# Patient Record
Sex: FEMALE | Race: WHITE
[De-identification: names, ages, dates, MRNs, and addresses within clinical notes are randomized per-mention and may not be internally consistent; named-entity substitution may affect disease eponyms.]

---

## 2018-04-21 ENCOUNTER — HOSPITAL ENCOUNTER (INPATIENT)
Dept: HOSPITAL 17 - NEPC | Age: 83
LOS: 4 days | Discharge: HOME HEALTH | DRG: 190 | End: 2018-04-25
Attending: HOSPITALIST | Admitting: HOSPITALIST
Payer: COMMERCIAL

## 2018-04-21 VITALS
DIASTOLIC BLOOD PRESSURE: 66 MMHG | OXYGEN SATURATION: 95 % | TEMPERATURE: 98.3 F | RESPIRATION RATE: 25 BRPM | HEART RATE: 106 BPM | SYSTOLIC BLOOD PRESSURE: 159 MMHG

## 2018-04-21 VITALS
DIASTOLIC BLOOD PRESSURE: 63 MMHG | HEART RATE: 101 BPM | OXYGEN SATURATION: 95 % | RESPIRATION RATE: 24 BRPM | TEMPERATURE: 98 F | SYSTOLIC BLOOD PRESSURE: 151 MMHG

## 2018-04-21 VITALS
DIASTOLIC BLOOD PRESSURE: 67 MMHG | TEMPERATURE: 97.8 F | SYSTOLIC BLOOD PRESSURE: 150 MMHG | HEART RATE: 101 BPM | OXYGEN SATURATION: 97 % | RESPIRATION RATE: 30 BRPM

## 2018-04-21 VITALS — OXYGEN SATURATION: 97 %

## 2018-04-21 VITALS — WEIGHT: 187.83 LBS | HEIGHT: 61 IN | BODY MASS INDEX: 35.46 KG/M2

## 2018-04-21 VITALS
HEART RATE: 102 BPM | TEMPERATURE: 98 F | SYSTOLIC BLOOD PRESSURE: 149 MMHG | DIASTOLIC BLOOD PRESSURE: 67 MMHG | RESPIRATION RATE: 20 BRPM | OXYGEN SATURATION: 95 %

## 2018-04-21 VITALS
RESPIRATION RATE: 15 BRPM | OXYGEN SATURATION: 99 % | SYSTOLIC BLOOD PRESSURE: 176 MMHG | DIASTOLIC BLOOD PRESSURE: 64 MMHG | HEART RATE: 85 BPM | TEMPERATURE: 98.3 F

## 2018-04-21 VITALS — OXYGEN SATURATION: 99 %

## 2018-04-21 VITALS — TEMPERATURE: 98 F | DIASTOLIC BLOOD PRESSURE: 81 MMHG | SYSTOLIC BLOOD PRESSURE: 133 MMHG

## 2018-04-21 VITALS
OXYGEN SATURATION: 95 % | TEMPERATURE: 98.1 F | HEART RATE: 99 BPM | RESPIRATION RATE: 19 BRPM | DIASTOLIC BLOOD PRESSURE: 76 MMHG | SYSTOLIC BLOOD PRESSURE: 154 MMHG

## 2018-04-21 VITALS
DIASTOLIC BLOOD PRESSURE: 91 MMHG | RESPIRATION RATE: 38 BRPM | OXYGEN SATURATION: 78 % | HEART RATE: 108 BPM | TEMPERATURE: 98 F | SYSTOLIC BLOOD PRESSURE: 170 MMHG

## 2018-04-21 VITALS
DIASTOLIC BLOOD PRESSURE: 67 MMHG | HEART RATE: 96 BPM | SYSTOLIC BLOOD PRESSURE: 155 MMHG | RESPIRATION RATE: 30 BRPM | OXYGEN SATURATION: 97 %

## 2018-04-21 VITALS — OXYGEN SATURATION: 96 %

## 2018-04-21 VITALS — HEART RATE: 96 BPM

## 2018-04-21 VITALS — HEART RATE: 89 BPM

## 2018-04-21 DIAGNOSIS — J44.1: Primary | ICD-10-CM

## 2018-04-21 DIAGNOSIS — E66.9: ICD-10-CM

## 2018-04-21 DIAGNOSIS — K21.9: ICD-10-CM

## 2018-04-21 DIAGNOSIS — Z85.3: ICD-10-CM

## 2018-04-21 DIAGNOSIS — Z87.11: ICD-10-CM

## 2018-04-21 DIAGNOSIS — R78.81: ICD-10-CM

## 2018-04-21 DIAGNOSIS — F17.210: ICD-10-CM

## 2018-04-21 DIAGNOSIS — J96.01: ICD-10-CM

## 2018-04-21 DIAGNOSIS — R07.9: ICD-10-CM

## 2018-04-21 DIAGNOSIS — E11.9: ICD-10-CM

## 2018-04-21 DIAGNOSIS — Z79.4: ICD-10-CM

## 2018-04-21 DIAGNOSIS — I48.91: ICD-10-CM

## 2018-04-21 LAB
BASOPHILS # BLD AUTO: 0 TH/MM3 (ref 0–0.2)
BASOPHILS NFR BLD: 0.4 % (ref 0–2)
BUN SERPL-MCNC: 21 MG/DL (ref 7–18)
CALCIUM SERPL-MCNC: 8.9 MG/DL (ref 8.5–10.1)
CHLORIDE SERPL-SCNC: 104 MEQ/L (ref 98–107)
CREAT SERPL-MCNC: 0.83 MG/DL (ref 0.5–1)
EOSINOPHIL # BLD: 0 TH/MM3 (ref 0–0.4)
EOSINOPHIL NFR BLD: 0.4 % (ref 0–4)
ERYTHROCYTE [DISTWIDTH] IN BLOOD BY AUTOMATED COUNT: 15.5 % (ref 11.6–17.2)
GFR SERPLBLD BASED ON 1.73 SQ M-ARVRAT: 66 ML/MIN (ref 89–?)
GLUCOSE SERPL-MCNC: 191 MG/DL (ref 74–106)
HCO3 BLD-SCNC: 29.5 MEQ/L (ref 21–32)
HCT VFR BLD CALC: 42.2 % (ref 35–46)
HGB BLD-MCNC: 13.7 GM/DL (ref 11.6–15.3)
INR PPP: 1.2 RATIO
LYMPHOCYTES # BLD AUTO: 1 TH/MM3 (ref 1–4.8)
LYMPHOCYTES NFR BLD AUTO: 16.7 % (ref 9–44)
MAGNESIUM SERPL-MCNC: 1.7 MG/DL (ref 1.5–2.5)
MCH RBC QN AUTO: 28.5 PG (ref 27–34)
MCHC RBC AUTO-ENTMCNC: 32.6 % (ref 32–36)
MCV RBC AUTO: 87.4 FL (ref 80–100)
MONOCYTE #: 0.7 TH/MM3 (ref 0–0.9)
MONOCYTES NFR BLD: 11.6 % (ref 0–8)
NEUTROPHILS # BLD AUTO: 4.2 TH/MM3 (ref 1.8–7.7)
NEUTROPHILS NFR BLD AUTO: 70.9 % (ref 16–70)
PLATELET # BLD: 155 TH/MM3 (ref 150–450)
PMV BLD AUTO: 8.4 FL (ref 7–11)
PROTHROMBIN TIME: 11.7 SEC (ref 9.8–11.6)
RBC # BLD AUTO: 4.82 MIL/MM3 (ref 4–5.3)
SODIUM SERPL-SCNC: 140 MEQ/L (ref 136–145)
TROPONIN I SERPL-MCNC: (no result) NG/ML (ref 0.02–0.05)
WBC # BLD AUTO: 5.9 TH/MM3 (ref 4–11)

## 2018-04-21 PROCEDURE — 85379 FIBRIN DEGRADATION QUANT: CPT

## 2018-04-21 PROCEDURE — 93306 TTE W/DOPPLER COMPLETE: CPT

## 2018-04-21 PROCEDURE — 83605 ASSAY OF LACTIC ACID: CPT

## 2018-04-21 PROCEDURE — 82552 ASSAY OF CPK IN BLOOD: CPT

## 2018-04-21 PROCEDURE — 82550 ASSAY OF CK (CPK): CPT

## 2018-04-21 PROCEDURE — 71275 CT ANGIOGRAPHY CHEST: CPT

## 2018-04-21 PROCEDURE — 83880 ASSAY OF NATRIURETIC PEPTIDE: CPT

## 2018-04-21 PROCEDURE — 87205 SMEAR GRAM STAIN: CPT

## 2018-04-21 PROCEDURE — 80053 COMPREHEN METABOLIC PANEL: CPT

## 2018-04-21 PROCEDURE — 36600 WITHDRAWAL OF ARTERIAL BLOOD: CPT

## 2018-04-21 PROCEDURE — 94640 AIRWAY INHALATION TREATMENT: CPT

## 2018-04-21 PROCEDURE — 80048 BASIC METABOLIC PNL TOTAL CA: CPT

## 2018-04-21 PROCEDURE — 82948 REAGENT STRIP/BLOOD GLUCOSE: CPT

## 2018-04-21 PROCEDURE — 84443 ASSAY THYROID STIM HORMONE: CPT

## 2018-04-21 PROCEDURE — 85610 PROTHROMBIN TIME: CPT

## 2018-04-21 PROCEDURE — 94664 DEMO&/EVAL PT USE INHALER: CPT

## 2018-04-21 PROCEDURE — 76937 US GUIDE VASCULAR ACCESS: CPT

## 2018-04-21 PROCEDURE — 94618 PULMONARY STRESS TESTING: CPT

## 2018-04-21 PROCEDURE — 87186 SC STD MICRODIL/AGAR DIL: CPT

## 2018-04-21 PROCEDURE — 85730 THROMBOPLASTIN TIME PARTIAL: CPT

## 2018-04-21 PROCEDURE — 84484 ASSAY OF TROPONIN QUANT: CPT

## 2018-04-21 PROCEDURE — 83735 ASSAY OF MAGNESIUM: CPT

## 2018-04-21 PROCEDURE — 85025 COMPLETE CBC W/AUTO DIFF WBC: CPT

## 2018-04-21 PROCEDURE — 96374 THER/PROPH/DIAG INJ IV PUSH: CPT

## 2018-04-21 PROCEDURE — 71045 X-RAY EXAM CHEST 1 VIEW: CPT

## 2018-04-21 PROCEDURE — 82805 BLOOD GASES W/O2 SATURATION: CPT

## 2018-04-21 PROCEDURE — 87040 BLOOD CULTURE FOR BACTERIA: CPT

## 2018-04-21 PROCEDURE — 87077 CULTURE AEROBIC IDENTIFY: CPT

## 2018-04-21 PROCEDURE — 93005 ELECTROCARDIOGRAM TRACING: CPT

## 2018-04-21 PROCEDURE — 87804 INFLUENZA ASSAY W/OPTIC: CPT

## 2018-04-21 RX ADMIN — HUMAN INSULIN SCH: 100 INJECTION, SOLUTION SUBCUTANEOUS at 11:52

## 2018-04-21 RX ADMIN — IPRATROPIUM BROMIDE AND ALBUTEROL SULFATE SCH AMPULE: .5; 3 SOLUTION RESPIRATORY (INHALATION) at 09:15

## 2018-04-21 RX ADMIN — IPRATROPIUM BROMIDE AND ALBUTEROL SULFATE SCH AMPULE: .5; 3 SOLUTION RESPIRATORY (INHALATION) at 18:25

## 2018-04-21 RX ADMIN — ENOXAPARIN SODIUM SCH MG: 40 INJECTION SUBCUTANEOUS at 11:51

## 2018-04-21 RX ADMIN — IPRATROPIUM BROMIDE AND ALBUTEROL SULFATE SCH AMPULE: .5; 3 SOLUTION RESPIRATORY (INHALATION) at 09:17

## 2018-04-21 RX ADMIN — STANDARDIZED SENNA CONCENTRATE AND DOCUSATE SODIUM SCH TAB: 8.6; 5 TABLET, FILM COATED ORAL at 20:58

## 2018-04-21 RX ADMIN — HUMAN INSULIN SCH: 100 INJECTION, SOLUTION SUBCUTANEOUS at 21:02

## 2018-04-21 RX ADMIN — METHYLPREDNISOLONE SODIUM SUCCINATE SCH MG: 40 INJECTION, POWDER, FOR SOLUTION INTRAMUSCULAR; INTRAVENOUS at 20:58

## 2018-04-21 RX ADMIN — METHYLPREDNISOLONE SODIUM SUCCINATE SCH MG: 40 INJECTION, POWDER, FOR SOLUTION INTRAMUSCULAR; INTRAVENOUS at 15:32

## 2018-04-21 RX ADMIN — IPRATROPIUM BROMIDE AND ALBUTEROL SULFATE SCH AMPULE: .5; 3 SOLUTION RESPIRATORY (INHALATION) at 19:42

## 2018-04-21 RX ADMIN — HUMAN INSULIN SCH: 100 INJECTION, SOLUTION SUBCUTANEOUS at 18:13

## 2018-04-21 RX ADMIN — Medication SCH ML: at 20:57

## 2018-04-21 RX ADMIN — IPRATROPIUM BROMIDE AND ALBUTEROL SULFATE SCH AMPULE: .5; 3 SOLUTION RESPIRATORY (INHALATION) at 11:55

## 2018-04-21 RX ADMIN — ACYCLOVIR SCH UNITS: 800 TABLET ORAL at 20:59

## 2018-04-21 NOTE — RADRPT
EXAM DATE/TIME:  04/21/2018 17:37 

 

[facility] COMPARISON:     

No previous studies available for comparison.

 

 

INDICATIONS :     

Elevated d-dimer; rule out pulmonary embolus.

                      

 

IV CONTRAST:     

71 cc Omnipaque 350 (iohexol) IV 

 

 

RADIATION DOSE:     

21.40 CTDIvol (mGy) 

 

 

MEDICAL HISTORY :     

Carcinoma, breast.  

 

SURGICAL HISTORY :       

nodule removed

 

ENCOUNTER:      

Initial

 

ACUITY:      

1 day

 

PAIN SCALE:      

3/10

 

LOCATION:        

chest 

 

TECHNIQUE:     

Volumetric scanning of the chest was performed using a pulmonary embolism protocol MIP images were re
constructed.  Using automated exposure control and adjustment of the mA and/or kV according to patien
t size, radiation dose was kept as low as reasonably achievable to obtain optimal diagnostic quality 
images.   DICOM format image data is available electronically for review and comparison.  

 

Follow-up recommendations for detected pulmonary nodules are based at a minimum on nodule size and pa
tient risk factors according to Fleischner Society Guidelines.

 

FINDINGS:     

No filling defects identified to suggest pulmonary embolic disease. There is no pleural or pericardia
l effusion. Mild coronary calcification. Minimal scattered scarring in the lungs. No adenopathy.

 

No acute findings in the upper abdomen. Probable 3.4 cm cyst in the liver incompletely evaluated. Spl
enic granulomata present.

 

CONCLUSION:     

1. No filling defects to suggest pulmonary embolic disease. No effusions or adenopathy.

 

 

 

 

 Gold Wilde MD on April 21, 2018 at 17:56           

Board Certified Radiologist.

 This report was verified electronically.

## 2018-04-21 NOTE — RADRPT
EXAM DATE/TIME:  04/21/2018 09:34 

 

[facility] COMPARISON:     

No previous studies available for comparison.

 

                     

INDICATIONS :     

Shortness of breath.

                     

 

MEDICAL HISTORY :     

Emphysema.  Carcinoma, breast.     Diabetes.   

 

SURGICAL HISTORY :        

Right lumpectomy.

 

ENCOUNTER:     

Initial                                        

 

ACUITY:     

1 day      

 

PAIN SCORE:     

0/10

 

LOCATION:     

Bilateral chest 

 

FINDINGS:     

A single view of the chest demonstrates the lungs to be symmetrically aerated without evidence of mas
s, infiltrate or effusion.  The cardiomediastinal contours are unremarkable. A mild to moderate scoli
osis is noted with degenerative changes in the lower thoracic and upper lumbar spine. There are multi
ple overlying electrocardiogram leads. Oxygen tubing is present as well.

 

CONCLUSION:     No acute disease.  

 

 

 

 Zacarias Magaña MD on April 21, 2018 at 9:48           

Board Certified Radiologist.

 This report was verified electronically.

## 2018-04-21 NOTE — EKG
Date Performed: 04/21/2018       Time Performed: 09:00:27

 

PTAGE:      82 years

 

EKG:      SINUS TACHYCARDIA ABNORMAL RHYTHM ECG INTERPRETATION BASED ON A DEFAULT AGE OF 40 YEARS 

 

NO PREVIOUS TRACING            

 

DOCTOR:   Shyam Martinez  Interpretating Date/Time  04/21/2018 16:47:20

## 2018-04-21 NOTE — HHI.HP
__________________________________________________





HPI


Service


[facility]


Primary Care Physician


Non-Staff


Admission Diagnosis





Diagnoses:  


Chief Complaint:  


Shortness of breath, wheezing


Travel History


International Travel<30 Days:  No


Contact w/Intl Traveler <30 Da:  No


Traveled to Known Affected Are:  No


History of Present Illness


Patient is a very pleasant 83yo F with PMH of COPD not on home oxygen  presents 

to the ED with c/o sob and cough since yesterday.  Said she is a heavy cig 

smoker and smoke a pack a day.  Denies any fever, chest pain, sob, n/v, 

abdominal pain, focal weakness or numbness.  Patient  is from [place] and 

visiting her friend.  Says she never been hospitalized for COPD in the past.  

She is following with her doctors and is taking her medications.  Does not have 

a pulmonology doctor.


Denies chest pain  or palpitations. No n/v/d/c. No urinary complaints.





Review of Systems


Except as stated in HPI:  all other systems reviewed are Neg





Past Family Social History


Past Medical History


COPD, diabetes mellitus


Past Surgical History


Right breast lumpectomy, hernia repair, right shoulder rotator cuff surgery


Reported Medications





Reported Meds & Active Scripts


Active


Reported


Novolog Inj (Insulin Aspart) 1,000 Unit/10 Ml Vial 3 Units SQ BID


Levemir Inj (Insulin Detemir) 1,000 unit/ 10 ML Vial 30 Units SQ BID


     Do not mix with any other Insulin.


Metformin (Metformin HCl) 1,000 Mg Tab 1,000 Mg PO BIDPC


Allergies:  


Coded Allergies:  


     Penicillins (Verified  Allergy, Unknown, RASH, 18)


     Sulfa (Sulfonamide Antibiotics) (Verified  Allergy, Unknown, HIVES, 18

)


Family History


Father with the diabetes and reactive cancer he passed away at the age of 79


Mother stroke at the age of 74


Social History


Tobacco use half a pack 1 pack per day


Denies EtOH or illicit drug use





Physical Exam


Vital Signs





Vital Signs








  Date Time  Temp Pulse Resp B/P (MAP) Pulse Ox O2 Delivery O2 Flow Rate FiO2


 


18 10:06 97.8 101 30 150/67 (94) 97 Nasal Cannula 4.00 


 


18 09:31  96 30 155/67 (96) 97 Nasal Cannula 4.00 


 


18 09:18     97 Nasal Cannula 4.00 


 


18 09:02 98.0 108 38 170/91 (117) 78   


 


18 09:02  103 36  95 Nasal Cannula 4.00 








Physical Exam


GENERAL: This is a very pleasant 82-year-old female, well-nourished, well-

developed patient, in no apparent distress.


SKIN: No rashes, ecchymoses or lesions. Cool and dry.


HEAD: Atraumatic. Normocephalic. No temporal or scalp tenderness.


EYES: Pupils equal round and reactive. Extraocular motions intact. No scleral 

icterus. No injection or drainage. 


ENT: Nose without bleeding, purulent drainage or septal hematoma. Throat 

without erythema, tonsillar hypertrophy or exudate. Uvula midline. Airway 

patent.


NECK: Trachea midline. No JVD or lymphadenopathy. Supple, nontender, no 

meningeal signs.


CARDIOVASCULAR: Regular rate and rhythm without murmurs, gallops, or rubs. 


RESPIRATORY: Decreased breath sounds.  +Scattered wheezing. + Shortness of 

breath. 


GASTROINTESTINAL: Abdomen soft, non-tender, nondistended. No hepato-splenomegaly

, or palpable masses. No guarding.


MUSCULOSKELETAL: Extremities without clubbing, cyanosis, or edema. No joint 

tenderness, effusion, or edema noted. No calf tenderness. Negative Homans sign 

bilaterally.


NEUROLOGICAL: Awake and alert. Cranial nerves II through XII intact.  Motor and 

sensory grossly within normal limits. Five out of 5 muscle strength in all 

muscle groups.  Normal speech.


Laboratory





Laboratory Tests








Test


  18


09:02 18


10:27


 


White Blood Count 5.9  


 


Red Blood Count 4.82  


 


Hemoglobin 13.7  


 


Hematocrit 42.2  


 


Mean Corpuscular Volume 87.4  


 


Mean Corpuscular Hemoglobin 28.5  


 


Mean Corpuscular Hemoglobin


Concent 32.6 


  


 


 


Red Cell Distribution Width 15.5  


 


Platelet Count 155  


 


Mean Platelet Volume 8.4  


 


Neutrophils (%) (Auto) 70.9  


 


Lymphocytes (%) (Auto) 16.7  


 


Monocytes (%) (Auto) 11.6  


 


Eosinophils (%) (Auto) 0.4  


 


Basophils (%) (Auto) 0.4  


 


Neutrophils # (Auto) 4.2  


 


Lymphocytes # (Auto) 1.0  


 


Monocytes # (Auto) 0.7  


 


Eosinophils # (Auto) 0.0  


 


Basophils # (Auto) 0.0  


 


CBC Comment DIFF FINAL  


 


Differential Comment   


 


Prothrombin Time 11.7  


 


Prothromb Time International


Ratio 1.2 


  


 


 


Activated Partial


Thromboplast Time 23.8 


  


 


 


Blood Urea Nitrogen 21  


 


Creatinine 0.83  


 


Random Glucose 191  


 


Calcium Level 8.9  


 


Magnesium Level 1.7  


 


Sodium Level 140  


 


Potassium Level 4.6  


 


Chloride Level 104  


 


Carbon Dioxide Level 29.5  


 


Anion Gap 7  


 


Estimat Glomerular Filtration


Rate 66 


  


 


 


Lactic Acid Level 1.8  


 


Troponin I LESS THAN 0.02  


 


Blood Gas Puncture Site  LT RADIAL 


 


Blood Gas Patient Temperature  98.6 


 


Blood Gas HCO3  28 


 


Blood Gas Base Excess  2.5 


 


Blood Gas Oxygen Saturation  90 


 


Arterial Blood pH  7.33 


 


Arterial Blood Partial


Pressure CO2 


  54 


 


 


Arterial Blood Partial


Pressure O2 


  70 


 


 


Arterial Blood Oxygen Content  16.2 


 


Arterial Blood


Carboxyhemoglobin 


  1.5 


 


 


Arterial Blood Methemoglobin  0.9 


 


Blood Gas Hemoglobin  12.8 


 


Oxygen Delivery Device  NASAL CANNULA 


 


Blood Gas Liter Flow  2 














 Date/Time


Source Procedure


Growth Status


 


 


 18 09:18


Blood Peripheral Aerobic Blood Culture


Pending Received


 


 18 09:18


Blood Peripheral Anaerobic Blood Culture


Pending Received


 


 18 09:55


Nasal Aspirate Influenza Types A,B Antigen (ISAIAH) - Final


NEGATIVE FOR FLU A AND B ANTIGEN.... Complete








Result Diagram:  


18





Imaging





Last Impressions








Chest X-Ray 18 Signed





Impressions: 





 Service Date/Time:  2018 09:34 - CONCLUSION: No acute 





 disease.       Michael R. Schiering, MD Caprini VTE Risk Assessment


Caprini VTE Risk Assessment:  Mod/High Risk (score >= 2)


Caprini Risk Assessment Model











 Point Value = 1          Point Value = 2  Point Value = 3  Point Value = 5


 


Age 41-60


Minor surgery


BMI > 25 kg/m2


Swollen legs


Varicose veins


Pregnancy or postpartum


History of unexplained or recurrent


   spontaneous 


Oral contraceptives or hormone


   replacement


Sepsis (< 1 month)


Serious lung disease, including


   pneumonia (< 1 month)


Abnormal pulmonary function


Acute myocardial infarction


Congestive heart failure (< 1 month)


History of inflammatory bowel disease


Medical patient at bed rest Age 61-74


Arthroscopic surgery


Major open surgery (> 45 min)


Laparoscopic surgery (> 45 min)


Malignancy


Confined to bed (> 72 hours)


Immobilizing plaster cast


Central venous access Age >= 75


History of VTE


Family history of VTE


Factor V Leiden


Prothrombin 26823T


Lupus anticoagulant


Anticardiolipin antibodies


Elevated serum homocysteine


Heparin-induced thrombocytopenia


Other congenital or acquired


   thrombophilia Stroke (< 1 month)


Elective arthroplasty


Hip, pelvis, or leg fracture


Acute spinal cord injury (< 1 month)








Prophylaxis Regimen











   Total Risk


Factor Score Risk Level Prophylaxis Regimen


 


0-1      Low Early ambulation


 


2 Moderate Order ONE of the following:


*Sequential Compression Device (SCD)


*Heparin 5000 units SQ BID


 


3-4 Higher Order ONE of the following medications:


*Heparin 5000 units SQ TID


*Enoxaparin/Lovenox 40 mg SQ daily (WT < 150 kg, CrCl > 30 mL/min)


*Enoxaparin/Lovenox 30 mg SQ daily (WT < 150 kg, CrCl > 10-29 mL/min)


*Enoxaparin/Lovenox 30 mg SQ BID (WT < 150 kg, CrCl > 30 mL/min)


AND/OR


*Sequential Compression Device (SCD)


 


5 or more Highest Order ONE of the following medications:


*Heparin 5000 units SQ TID (Preferred with Epidurals)


*Enoxaparin/Lovenox 40 mg SQ daily (WT < 150 kg, CrCl > 30 mL/min)


*Enoxaparin/Lovenox 30 mg SQ daily (WT < 150 kg, CrCl > 10-29 mL/min)


*Enoxaparin/Lovenox 30 mg SQ BID (WT < 150 kg, CrCl > 30 mL/min)


AND


*Sequential Compression Device (SCD)











Assessment and Plan


Problem List:  


(1) Insulin dependent diabetes mellitus


ICD Code:  E11.9 - Type 2 diabetes mellitus without complications; Z79.4 - Long 

term (current) use of insulin


(2) GERD (gastroesophageal reflux disease)


ICD Code:  K21.9 - Gastro-esophageal reflux disease without esophagitis


(3) COPD exacerbation


ICD Code:  J44.1 - Chronic obstructive pulmonary disease with (acute) 

exacerbation


Status:  Acute


Assessment and Plan


Very pleasant 52-year-old female with





COPD with exacerbation


Hypoxia. Acute respiratory failure patient was dessatting at 78  % while on 2L


ABG reviewed


Duo nebs every 4 hours scheduled and every 2 hours as needed taper as tolerated.


Receive Zometa on 125 mg IV 1 time.  Continue Solu-Medrol 60 mg every 6 hours 

taper steroids as tolerated.


Monitor oxygen saturation and titrate oxygen as needed.  Oxygen supplement keep 

oxygen saturation more than 92%


Will need PFT as outpatient


Patient is also a history of breast cancer with lumpectomy.  Ordered d-dimer to 

rule out DVT /PE.  Positive d-dimer ordered  CTA pulmonary to rule out PE. CTA 

reviewed and no PE. 


Ativan prn for withdrawal symptoms, patient is a smoker 1 PPD refusing nicotine 

patch at this time. Counselled extensively regarding tobacco use. 





Insulin-dependent diabetes mellitus.  Restart home insulin Levemir 30 units 

twice daily, insulin sliding scale.  Accu-Cheks.  Monitor blood sugar and 

adjust insulin as needed








DVT prophylaxis SCDs/teds/Lovenox SQ


Discussed Condition With


Patient, family at bedside, nurse, ED physician Dr. Nava





Physician Certification


2 Midnight Certification Type:  Admission for Inpatient Services


Order for Inpatient Services


The services are ordered in accordance with Medicare regulations or non-

Medicare payer requirements, as applicable.  In the case of services not 

specified as inpatient-only, they are appropriately provided as inpatient 

services in accordance with the 2-midnight benchmark.


Estimated LOS (days):  3


 days is the estimated time the patient will need to remain in the hospital, 

assuming treatment plan goals are met and no additional complications.


Post-Hospital Plan:  Home











Michelle Owusu MD 2018 11:14

## 2018-04-21 NOTE — PD
HPI


Chief Complaint:  Respiratory Distress


Time Seen by Provider:  09:06


Travel History


International Travel<30 days:  No


Contact w/Intl Traveler<30days:  No


Traveled to known affect area:  No





History of Present Illness


HPI


81yo F with PMH of COPD not on home O2 presents to the ED with c/o sob and 

cough since yesterday.  Said she is a heavy cig smoker and smoke a pack a day.  

Denies any fever, chest pain, sob, n/v, abdominal pain, focal weakness or 

numbness.  Pt is from [place] and visiting her friend.





PFSH


Past Medical History


Diabetes:  Yes


Respiratory:  Yes





Social History


Tobacco Use:  Yes





Allergies-Medications


(Allergen,Severity, Reaction):  


Coded Allergies:  


     Penicillins (Verified  Allergy, Unknown, RASH, 4/21/18)


     Sulfa (Sulfonamide Antibiotics) (Verified  Allergy, Unknown, HIVES, 4/21/18

)


Reported Meds & Prescriptions





Reported Meds & Active Scripts


Active


Reported


Novolog Inj (Insulin Aspart) 1,000 Unit/10 Ml Vial 3 Units SQ BID


Levemir Inj (Insulin Detemir) 1,000 unit/ 10 ML Vial 30 Units SQ BID


     Do not mix with any other Insulin.


Metformin (Metformin HCl) 1,000 Mg Tab 1,000 Mg PO BIDPC








Review of Systems


Except as stated in HPI:  all other systems reviewed are Neg





Physical Exam


Narrative


GENERAL: 81yo F in moderate distress.


SKIN: Focused skin assessment warm/dry.


HEAD: Atraumatic. Normocephalic. 


EYES: Pupils equal and round. No scleral icterus. No injection or drainage. 


ENT: No nasal bleeding or discharge.  Mucous membranes pink and moist.


NECK: Trachea midline. No JVD. 


CARDIOVASCULAR: Regular rate and rhythm.  No murmur appreciated.


RESPIRATORY: + accessory muscle use. Expiratory wheezing diffusely.


GASTROINTESTINAL: Abdomen soft, non-tender, nondistended. Hepatic and splenic 

margins not palpable. 


MUSCULOSKELETAL: No obvious deformities. No clubbing.  No cyanosis.  No edema. 


NEUROLOGICAL: Awake and alert. No obvious cranial nerve deficits.  Motor 

grossly within normal limits. Normal speech.


PSYCHIATRIC: Appropriate mood and affect; insight and judgment normal.





Data


Data


Last Documented VS





Vital Signs








  Date Time  Temp Pulse Resp B/P (MAP) Pulse Ox O2 Delivery O2 Flow Rate FiO2


 


4/21/18 10:06 97.8 101 30 150/67 (94) 97 Nasal Cannula 4.00 








Orders





 Orders


Complete Blood Count With Diff (4/21/18 09:06)


Basic Metabolic Panel (Bmp) (4/21/18 09:06)


B-Type Natriuretic Peptide (4/21/18 09:06)


Act Partial Throm Time (Ptt) (4/21/18 09:06)


Prothrombin Time / Inr (Pt) (4/21/18 09:06)


Magnesium (Mg) (4/21/18 09:06)


Troponin I (4/21/18 09:06)


Influenzae A/B Antigen (4/21/18 09:06)


Blood Culture (4/21/18 09:06)


Electrocardiogram (4/21/18 09:06)


Chest, Single Ap (4/21/18 09:06)


Methylprednisolone So Succ Inj (Solumedr (4/21/18 09:15)


Albuterol-Ipratropium Neb (Duoneb Neb) (4/21/18 09:15)


Lactic Acid Sepsis Protocol (4/21/18 09:08)


Arterial Blood Gas (Abg) (4/21/18 )


Admit To Inpatient (4/21/18 )


Vital Signs (Adult) Q4H (4/21/18 11:12)


Activity Oob With Assistance (4/21/18 11:12)


Diet Heart Healthy (4/21/18 Lunch)


Sodium Chloride 0.9% Flush (Ns Flush) (4/21/18 11:15)


Sodium Chloride 0.9% Flush (Ns Flush) (4/21/18 21:00)


Acetaminophen (Tylenol) (4/21/18 11:15)


Ondansetron Inj (Zofran Inj) (4/21/18 11:15)


Comprehensive Metabolic Panel (4/22/18 06:00)


Complete Blood Count With Diff (4/22/18 06:00)


Resp Oxygen Denis C Titrat 1-4 L (4/21/18 )


Pt Request For Service (4/21/18 11:12)


Case Management Consult (4/21/18 11:12)


Enoxaparin Inj (Lovenox Inj) (4/21/18 12:00)


Scd Bilateral/Knee High BETY.BID (4/21/18 11:12)


Raf Bilateral/Knee High BETY.QSHIFT (4/21/18 11:12)


Naloxone Inj (Narcan Inj) (4/21/18 11:15)


Docusate Sodium-Senna (Sarah-Colace) (4/21/18 21:00)


Magnesium Hydroxide Liq (Milk Of Magnesi (4/21/18 11:15)


Sennosides (Senokot) (4/21/18 11:15)


Bisacodyl Supp (Dulcolax Supp) (4/21/18 11:15)


Lactulose Liq (Lactulose Liq) (4/21/18 11:15)


Inpatient Certification (4/21/18 )


Albuterol-Ipratropium Neb (Duoneb Neb) (4/21/18 12:00)


Albuterol-Ipratropium Neb (Duoneb Neb) (4/21/18 11:15)


Methylprednisolone So Succ Inj (Solumedr (4/21/18 15:00)


Bedside Glucose BETY.CSUGAR (4/21/18 11:17)


Blood Glucose Goal (Criteria) (4/21/18 11:17)


Hypoglycemia 70 Mg/Dl Or < (4/21/18 11:17)


Notify Dr: Other (4/21/18 11:17)


Dextrose 50% In Reginaldo (Vial) Inj (D50w (Vi (4/21/18 11:30)


Glucagon Inj (Glucagon Inj) (4/21/18 11:30)


Consult Diabetic Educator (4/21/18 )


Insulin Human Reg Supp Scale (Novolin R (4/21/18 12:00)


Insulin Detemir Inj (Levemir Inj) (4/21/18 21:00)


Admit Order (Ed Use Only) (4/21/18 11:19)





Labs





Laboratory Tests








Test


  4/21/18


09:02 4/21/18


10:27


 


White Blood Count 5.9 TH/MM3  


 


Red Blood Count 4.82 MIL/MM3  


 


Hemoglobin 13.7 GM/DL  


 


Hematocrit 42.2 %  


 


Mean Corpuscular Volume 87.4 FL  


 


Mean Corpuscular Hemoglobin 28.5 PG  


 


Mean Corpuscular Hemoglobin


Concent 32.6 % 


  


 


 


Red Cell Distribution Width 15.5 %  


 


Platelet Count 155 TH/MM3  


 


Mean Platelet Volume 8.4 FL  


 


Neutrophils (%) (Auto) 70.9 %  


 


Lymphocytes (%) (Auto) 16.7 %  


 


Monocytes (%) (Auto) 11.6 %  


 


Eosinophils (%) (Auto) 0.4 %  


 


Basophils (%) (Auto) 0.4 %  


 


Neutrophils # (Auto) 4.2 TH/MM3  


 


Lymphocytes # (Auto) 1.0 TH/MM3  


 


Monocytes # (Auto) 0.7 TH/MM3  


 


Eosinophils # (Auto) 0.0 TH/MM3  


 


Basophils # (Auto) 0.0 TH/MM3  


 


CBC Comment DIFF FINAL  


 


Differential Comment   


 


Prothrombin Time 11.7 SEC  


 


Prothromb Time International


Ratio 1.2 RATIO 


  


 


 


Activated Partial


Thromboplast Time 23.8 SEC 


  


 


 


Blood Urea Nitrogen 21 MG/DL  


 


Creatinine 0.83 MG/DL  


 


Random Glucose 191 MG/DL  


 


Calcium Level 8.9 MG/DL  


 


Magnesium Level 1.7 MG/DL  


 


Sodium Level 140 MEQ/L  


 


Potassium Level 4.6 MEQ/L  


 


Chloride Level 104 MEQ/L  


 


Carbon Dioxide Level 29.5 MEQ/L  


 


Anion Gap 7 MEQ/L  


 


Estimat Glomerular Filtration


Rate 66 ML/MIN 


  


 


 


Lactic Acid Level 1.8 mmol/L  


 


Troponin I


  LESS THAN 0.02


NG/ML 


 


 


Blood Gas Puncture Site  LT RADIAL 


 


Blood Gas Patient Temperature  98.6 


 


Blood Gas HCO3  28 mmol/L 


 


Blood Gas Base Excess  2.5 mmol/L 


 


Blood Gas Oxygen Saturation  90 % 


 


Arterial Blood pH  7.33 


 


Arterial Blood Partial


Pressure CO2 


  54 mmHg 


 


 


Arterial Blood Partial


Pressure O2 


  70 mmHG 


 


 


Arterial Blood Oxygen Content  16.2 Vol % 


 


Arterial Blood


Carboxyhemoglobin 


  1.5 % 


 


 


Arterial Blood Methemoglobin  0.9 % 


 


Blood Gas Hemoglobin  12.8 G/DL 


 


Oxygen Delivery Device  NASAL CANNULA 


 


Blood Gas Liter Flow  2 L/M 











MDM


Medical Decision Making


Medical Screen Exam Complete:  Yes


Emergency Medical Condition:  Yes


Interpretation(s)


EKG: Sinus tachycardia at 105bpm.  Normal axis.  No ST segment elevation or 

depression.


Differential Diagnosis


COPD exacerbation vs. pneumonia vs. ACS


Narrative Course


81yo F with COPD here with sob since yesterday. Pt is tachypneic and hypoxic on 

arrival.  O2 sat is 79% on RA and place on oxygen immediately.  Oxygen improved 

to mid 80s on 2L NC and mid 90s on 4L NC.  Labs reviewed, no leukocytosis.  

Lactic acid normal at 1.8.  Troponin negative.  ABG on 2L showed pO2 70 with O2 

sat 90%.  pCO2 is 54, likely chronic.  pH of 7.33.  Pt reevaluated after 

duonebs x3 and methylprednisolone and is feeling much better.  Influenza 

negative.  CXR negative.  Pt's oxygen is at 90% on 2L so respiratory therapy 

place it back on 4L.  Pt said she feels much better but is still wheezing and 

mildly tachypneic.  At this time, do not think she needs BIPAP but she may if 

she remains tachypneic.  Discussed with Dr. Owusu and accepted to her service.


Critical Care Narrative


Aggregate critical care time was 40 minutes. Time to perform other separately 

billable procedures was not  


included in the critical care time. My time did not include minutes spent 

treating any other patients simultaneously or on  


activities that did not directly contribute to the patient's treatment.  





The services I provided to this patient were to treat and/or prevent clinically 

significant deterioration that could result  


in:  respiratory distress or death.





I provided critical care services requiring my management, as noted below:


Chart data review, documentation time, medication orders and management, vital 

sign assessments/reviewing monitor data,  


ordering and reviewing lab tests, ordering and interpreting/reviewing x-rays 

and diagnostic studies, care of the patient  


and discussion of the patient with the admitting physicians.





Diagnosis





 Primary Impression:  


 COPD exacerbation





Admitting Information


Admitting Physician Requests:  Luzmaria Mcgrath DO Apr 21, 2018 09:20
98.6

## 2018-04-22 VITALS
DIASTOLIC BLOOD PRESSURE: 58 MMHG | HEART RATE: 105 BPM | OXYGEN SATURATION: 96 % | RESPIRATION RATE: 19 BRPM | TEMPERATURE: 98.1 F | SYSTOLIC BLOOD PRESSURE: 113 MMHG

## 2018-04-22 VITALS — HEART RATE: 84 BPM

## 2018-04-22 VITALS
SYSTOLIC BLOOD PRESSURE: 160 MMHG | DIASTOLIC BLOOD PRESSURE: 70 MMHG | HEART RATE: 90 BPM | TEMPERATURE: 98 F | RESPIRATION RATE: 20 BRPM | OXYGEN SATURATION: 97 %

## 2018-04-22 VITALS
OXYGEN SATURATION: 93 % | HEART RATE: 95 BPM | TEMPERATURE: 98.7 F | SYSTOLIC BLOOD PRESSURE: 162 MMHG | DIASTOLIC BLOOD PRESSURE: 75 MMHG | RESPIRATION RATE: 19 BRPM

## 2018-04-22 VITALS
OXYGEN SATURATION: 94 % | HEART RATE: 108 BPM | DIASTOLIC BLOOD PRESSURE: 71 MMHG | TEMPERATURE: 97.8 F | SYSTOLIC BLOOD PRESSURE: 152 MMHG | RESPIRATION RATE: 20 BRPM

## 2018-04-22 VITALS
TEMPERATURE: 98.9 F | SYSTOLIC BLOOD PRESSURE: 123 MMHG | DIASTOLIC BLOOD PRESSURE: 57 MMHG | HEART RATE: 98 BPM | RESPIRATION RATE: 20 BRPM | OXYGEN SATURATION: 95 %

## 2018-04-22 VITALS — OXYGEN SATURATION: 97 %

## 2018-04-22 VITALS
TEMPERATURE: 98.6 F | HEART RATE: 104 BPM | SYSTOLIC BLOOD PRESSURE: 149 MMHG | RESPIRATION RATE: 20 BRPM | OXYGEN SATURATION: 94 % | DIASTOLIC BLOOD PRESSURE: 70 MMHG

## 2018-04-22 VITALS — HEART RATE: 110 BPM

## 2018-04-22 VITALS — OXYGEN SATURATION: 95 %

## 2018-04-22 VITALS — HEART RATE: 79 BPM

## 2018-04-22 LAB
ALBUMIN SERPL-MCNC: 3.2 GM/DL (ref 3.4–5)
ALP SERPL-CCNC: 79 U/L (ref 45–117)
ALT SERPL-CCNC: 21 U/L (ref 10–53)
AST SERPL-CCNC: 17 U/L (ref 15–37)
BASOPHILS # BLD AUTO: 0 TH/MM3 (ref 0–0.2)
BASOPHILS NFR BLD: 0.2 % (ref 0–2)
BILIRUB SERPL-MCNC: 0.3 MG/DL (ref 0.2–1)
BUN SERPL-MCNC: 26 MG/DL (ref 7–18)
CALCIUM SERPL-MCNC: 9 MG/DL (ref 8.5–10.1)
CHLORIDE SERPL-SCNC: 102 MEQ/L (ref 98–107)
CREAT SERPL-MCNC: 0.79 MG/DL (ref 0.5–1)
EOSINOPHIL # BLD: 0 TH/MM3 (ref 0–0.4)
EOSINOPHIL NFR BLD: 0 % (ref 0–4)
ERYTHROCYTE [DISTWIDTH] IN BLOOD BY AUTOMATED COUNT: 15.2 % (ref 11.6–17.2)
GFR SERPLBLD BASED ON 1.73 SQ M-ARVRAT: 70 ML/MIN (ref 89–?)
GLUCOSE SERPL-MCNC: 236 MG/DL (ref 74–106)
HCO3 BLD-SCNC: 27.5 MEQ/L (ref 21–32)
HCT VFR BLD CALC: 38.3 % (ref 35–46)
HGB BLD-MCNC: 12.9 GM/DL (ref 11.6–15.3)
LYMPHOCYTES # BLD AUTO: 0.7 TH/MM3 (ref 1–4.8)
LYMPHOCYTES NFR BLD AUTO: 6.2 % (ref 9–44)
MCH RBC QN AUTO: 29.2 PG (ref 27–34)
MCHC RBC AUTO-ENTMCNC: 33.6 % (ref 32–36)
MCV RBC AUTO: 86.9 FL (ref 80–100)
MONOCYTE #: 0.5 TH/MM3 (ref 0–0.9)
MONOCYTES NFR BLD: 4.5 % (ref 0–8)
NEUTROPHILS # BLD AUTO: 9.8 TH/MM3 (ref 1.8–7.7)
NEUTROPHILS NFR BLD AUTO: 89.1 % (ref 16–70)
PLATELET # BLD: 155 TH/MM3 (ref 150–450)
PMV BLD AUTO: 8.8 FL (ref 7–11)
PROT SERPL-MCNC: 8.5 GM/DL (ref 6.4–8.2)
RBC # BLD AUTO: 4.41 MIL/MM3 (ref 4–5.3)
SODIUM SERPL-SCNC: 137 MEQ/L (ref 136–145)
WBC # BLD AUTO: 11 TH/MM3 (ref 4–11)

## 2018-04-22 RX ADMIN — ENOXAPARIN SODIUM SCH MG: 40 INJECTION SUBCUTANEOUS at 11:39

## 2018-04-22 RX ADMIN — METHYLPREDNISOLONE SODIUM SUCCINATE SCH MG: 40 INJECTION, POWDER, FOR SOLUTION INTRAMUSCULAR; INTRAVENOUS at 15:07

## 2018-04-22 RX ADMIN — IPRATROPIUM BROMIDE AND ALBUTEROL SULFATE SCH AMPULE: .5; 3 SOLUTION RESPIRATORY (INHALATION) at 20:13

## 2018-04-22 RX ADMIN — METHYLPREDNISOLONE SODIUM SUCCINATE SCH MG: 40 INJECTION, POWDER, FOR SOLUTION INTRAMUSCULAR; INTRAVENOUS at 03:20

## 2018-04-22 RX ADMIN — HUMAN INSULIN SCH: 100 INJECTION, SOLUTION SUBCUTANEOUS at 21:41

## 2018-04-22 RX ADMIN — GUAIFENESIN SCH MG: 600 TABLET, EXTENDED RELEASE ORAL at 21:39

## 2018-04-22 RX ADMIN — IPRATROPIUM BROMIDE AND ALBUTEROL SULFATE SCH AMPULE: .5; 3 SOLUTION RESPIRATORY (INHALATION) at 09:00

## 2018-04-22 RX ADMIN — HUMAN INSULIN SCH: 100 INJECTION, SOLUTION SUBCUTANEOUS at 08:21

## 2018-04-22 RX ADMIN — HUMAN INSULIN SCH: 100 INJECTION, SOLUTION SUBCUTANEOUS at 17:37

## 2018-04-22 RX ADMIN — ACYCLOVIR SCH UNITS: 800 TABLET ORAL at 08:14

## 2018-04-22 RX ADMIN — STANDARDIZED SENNA CONCENTRATE AND DOCUSATE SODIUM SCH TAB: 8.6; 5 TABLET, FILM COATED ORAL at 08:13

## 2018-04-22 RX ADMIN — IPRATROPIUM BROMIDE AND ALBUTEROL SULFATE SCH AMPULE: .5; 3 SOLUTION RESPIRATORY (INHALATION) at 16:43

## 2018-04-22 RX ADMIN — Medication SCH ML: at 08:20

## 2018-04-22 RX ADMIN — ACYCLOVIR SCH UNITS: 800 TABLET ORAL at 21:40

## 2018-04-22 RX ADMIN — Medication SCH ML: at 21:42

## 2018-04-22 RX ADMIN — HUMAN INSULIN SCH: 100 INJECTION, SOLUTION SUBCUTANEOUS at 11:40

## 2018-04-22 RX ADMIN — LEVOFLOXACIN SCH MG: 500 TABLET, FILM COATED ORAL at 08:28

## 2018-04-22 RX ADMIN — METHYLPREDNISOLONE SODIUM SUCCINATE SCH MG: 40 INJECTION, POWDER, FOR SOLUTION INTRAMUSCULAR; INTRAVENOUS at 21:39

## 2018-04-22 RX ADMIN — STANDARDIZED SENNA CONCENTRATE AND DOCUSATE SODIUM SCH TAB: 8.6; 5 TABLET, FILM COATED ORAL at 21:00

## 2018-04-22 RX ADMIN — METHYLPREDNISOLONE SODIUM SUCCINATE SCH MG: 40 INJECTION, POWDER, FOR SOLUTION INTRAMUSCULAR; INTRAVENOUS at 08:14

## 2018-04-22 RX ADMIN — IPRATROPIUM BROMIDE AND ALBUTEROL SULFATE SCH AMPULE: .5; 3 SOLUTION RESPIRATORY (INHALATION) at 11:59

## 2018-04-22 NOTE — HHI.PR
Subjective


Remarks


Pt seen and examined. VS reviewed.  Requiring 3 L supplemental O2.  Patient 

reports her breathing is mildly better than yesterday but she states she has 

not really been ambulatory. She denies chest pain, abdominal pain, nausea, 

vomiting. Her appetite is good. She states she has a dry cough and feels like 

she needs to clear mucus from her chest.





I spoke with her daughter on the phone who states that the patient has been 

smoking significantly more in the past couple months.  She follows with a 

primary care doctor who has prescribed her inhalers for COPD but the patient 

refuses to take them.  She states she does not believe her mother acknowledges 

the severity of her lung disease.  The patient states she smokes a pack per day 

but wants to quit.  At this time she denies A nicotine patch.





Objective





Vital Signs








  Date Time  Temp Pulse Resp B/P (MAP) Pulse Ox O2 Delivery O2 Flow Rate FiO2


 


4/22/18 04:00 98.0 90 20 160/70 (100) 97   


 


4/22/18 03:42  79      


 


4/22/18 00:00 97.8 108 20 152/71 (98) 94   


 


4/21/18 23:40  89      


 


4/21/18 20:00 98.0 102 20 149/67 (94) 95   


 


4/21/18 19:47  96      


 


4/21/18 19:44     96 Nasal Cannula 3.00 


 


4/21/18 18:25     99 Nasal Cannula 4.00 


 


4/21/18 16:00 98.1 99 19 154/76 (102) 95   


 


4/21/18 15:55 98.0 98 22 133/81 (98) 97 Nasal Cannula 4.00 


 


4/21/18 15:18 98.3 85 15 176/64 (101) 99 Nasal Cannula 2.00 


 


4/21/18 13:48 98.3 106 25 159/66 (97) 95 Nasal Cannula  


 


4/21/18 11:46 98.0 101 24 151/63 (92) 95 Nasal Cannula 4.00 


 


4/21/18 10:06 97.8 101 30 150/67 (94) 97 Nasal Cannula 4.00 


 


4/21/18 09:31  96 30 155/67 (96) 97 Nasal Cannula 4.00 


 


4/21/18 09:18     97 Nasal Cannula 4.00 


 


4/21/18 09:02 98.0 108 38 170/91 (117) 78   


 


4/21/18 09:02  103 36  95 Nasal Cannula 4.00 














I/O      


 


 4/21/18 4/21/18 4/21/18 4/22/18 4/22/18 4/22/18





 07:00 15:00 23:00 07:00 15:00 23:00


 


Intake Total  300 ml  600 ml  


 


Balance  300 ml  600 ml  


 


      


 


Intake Oral  300 ml  600 ml  


 


# Voids    4  


 


# Bowel Movements    0  








Result Diagram:  


4/22/18 0620                                                                   

             4/22/18 0620





Imaging











Chest X-Ray 4/21/18 0906 Signed





Impressions: 





 Service Date/Time:  Saturday, April 21, 2018 09:34 - CONCLUSION: No acute 





 disease.       Zacarias Magaña MD 


 


CT Angiography 4/21/18 0000 Signed





Impressions: 





 Service Date/Time:  Saturday, April 21, 2018 17:37 - CONCLUSION:  1. No 

filling 





 defects to suggest pulmonary embolic disease. No effusions or adenopathy.      





 Gold Wilde MD 








Objective Remarks


GENERAL: Pleasant, obese [place]mani female resting in bed in NAD.


SKIN: Warm and dry.


HEENT: AT/NC. Pupils equal and round. MMM.


HEART: RRR no m/r/g.


LUNGS: Diminished breath sounds with expiratory wheezing.


ABDOMEN: +BS, soft, NT, ND.


EXTREMITIES: No LE edema. Diminished but palpable pedal pulses.


NEURO: Awake and alert. Nonfocal.


PSYCH: Appropriate mood and affect.





A/P


Assessment and Plan


82 year old [place] female with COPD, IDDM, tobacco abuse, and history of breast 

cancer admitted on 4/21 for acute COPD exacerbation.





1. COPD exacerbation


- Desatted to 78% on room air in the ED


- Maintaining adequate sats on 2-4 L NC


- DuoNeb Q4H scheduled 


- SoluMedrol 40 mg IV Q6H


- White cell count bumped from 5.9 to 11 likely secondary to steroid 

demargination but added Levaquin for coverage of exacerbation since severe 

enough to require hospitalization


- Counseled extensively on the need to be compliant with maintenance inhalers 

and the absolute need to stop smoking





2. Hypoxia


- ABG on admission: pH 7.33, PCO2 54, PO2 70, HCO3 28


- Supplemental O2 PRN


- D-dimer elevated given hypoxia and h/o breast cancer but CTA negative for 

embolism


- May need walk test prior to discharge if still requiring O2





3. Insulin dependent DM


- Levemir 30 units BID


- SSI per protocol





4. Tobacco abuse


- Refusing nicotine patch


- Ativan PRN withdraw symptoms


- Counseled extensively on cessation





DVT prophylaxis: SCDs/teds/Lovenox SQ


Discharge Planning


Anticipate D/C in a couple days if patient continues to improve











Sarah Sheridan MD Apr 22, 2018 08:20

## 2018-04-23 VITALS
RESPIRATION RATE: 17 BRPM | TEMPERATURE: 98.1 F | OXYGEN SATURATION: 94 % | SYSTOLIC BLOOD PRESSURE: 155 MMHG | HEART RATE: 91 BPM | DIASTOLIC BLOOD PRESSURE: 64 MMHG

## 2018-04-23 VITALS
DIASTOLIC BLOOD PRESSURE: 78 MMHG | OXYGEN SATURATION: 97 % | SYSTOLIC BLOOD PRESSURE: 138 MMHG | RESPIRATION RATE: 60 BRPM | HEART RATE: 104 BPM

## 2018-04-23 VITALS
DIASTOLIC BLOOD PRESSURE: 71 MMHG | OXYGEN SATURATION: 96 % | TEMPERATURE: 98.4 F | RESPIRATION RATE: 43 BRPM | SYSTOLIC BLOOD PRESSURE: 138 MMHG | HEART RATE: 95 BPM

## 2018-04-23 VITALS
HEART RATE: 80 BPM | RESPIRATION RATE: 19 BRPM | TEMPERATURE: 98 F | OXYGEN SATURATION: 97 % | SYSTOLIC BLOOD PRESSURE: 138 MMHG | DIASTOLIC BLOOD PRESSURE: 66 MMHG

## 2018-04-23 VITALS
RESPIRATION RATE: 22 BRPM | SYSTOLIC BLOOD PRESSURE: 131 MMHG | OXYGEN SATURATION: 97 % | DIASTOLIC BLOOD PRESSURE: 75 MMHG | TEMPERATURE: 98.4 F | HEART RATE: 102 BPM

## 2018-04-23 VITALS — OXYGEN SATURATION: 97 %

## 2018-04-23 VITALS
OXYGEN SATURATION: 97 % | SYSTOLIC BLOOD PRESSURE: 119 MMHG | DIASTOLIC BLOOD PRESSURE: 70 MMHG | RESPIRATION RATE: 50 BRPM | HEART RATE: 86 BPM | TEMPERATURE: 98.7 F

## 2018-04-23 VITALS — HEART RATE: 92 BPM

## 2018-04-23 VITALS
DIASTOLIC BLOOD PRESSURE: 62 MMHG | TEMPERATURE: 98.2 F | SYSTOLIC BLOOD PRESSURE: 128 MMHG | HEART RATE: 81 BPM | OXYGEN SATURATION: 97 % | RESPIRATION RATE: 20 BRPM

## 2018-04-23 VITALS — HEART RATE: 86 BPM

## 2018-04-23 VITALS
DIASTOLIC BLOOD PRESSURE: 78 MMHG | RESPIRATION RATE: 20 BRPM | TEMPERATURE: 97.7 F | OXYGEN SATURATION: 97 % | HEART RATE: 81 BPM | SYSTOLIC BLOOD PRESSURE: 134 MMHG

## 2018-04-23 VITALS — OXYGEN SATURATION: 96 %

## 2018-04-23 VITALS — HEART RATE: 84 BPM

## 2018-04-23 VITALS — HEART RATE: 80 BPM

## 2018-04-23 LAB
BASOPHILS # BLD AUTO: 0 TH/MM3 (ref 0–0.2)
BASOPHILS NFR BLD: 0.2 % (ref 0–2)
BUN SERPL-MCNC: 35 MG/DL (ref 7–18)
BUN SERPL-MCNC: 37 MG/DL (ref 7–18)
CALCIUM SERPL-MCNC: 8.9 MG/DL (ref 8.5–10.1)
CALCIUM SERPL-MCNC: 9 MG/DL (ref 8.5–10.1)
CHLORIDE SERPL-SCNC: 100 MEQ/L (ref 98–107)
CHLORIDE SERPL-SCNC: 102 MEQ/L (ref 98–107)
CREAT SERPL-MCNC: 0.79 MG/DL (ref 0.5–1)
CREAT SERPL-MCNC: 0.99 MG/DL (ref 0.5–1)
EOSINOPHIL # BLD: 0 TH/MM3 (ref 0–0.4)
EOSINOPHIL NFR BLD: 0 % (ref 0–4)
ERYTHROCYTE [DISTWIDTH] IN BLOOD BY AUTOMATED COUNT: 15.2 % (ref 11.6–17.2)
GFR SERPLBLD BASED ON 1.73 SQ M-ARVRAT: 54 ML/MIN (ref 89–?)
GFR SERPLBLD BASED ON 1.73 SQ M-ARVRAT: 70 ML/MIN (ref 89–?)
GLUCOSE SERPL-MCNC: 195 MG/DL (ref 74–106)
GLUCOSE SERPL-MCNC: 354 MG/DL (ref 74–106)
HCO3 BLD-SCNC: 28.7 MEQ/L (ref 21–32)
HCO3 BLD-SCNC: 29 MEQ/L (ref 21–32)
HCT VFR BLD CALC: 36.8 % (ref 35–46)
HGB BLD-MCNC: 12.1 GM/DL (ref 11.6–15.3)
LYMPHOCYTES # BLD AUTO: 0.6 TH/MM3 (ref 1–4.8)
LYMPHOCYTES NFR BLD AUTO: 4.7 % (ref 9–44)
MCH RBC QN AUTO: 28.2 PG (ref 27–34)
MCHC RBC AUTO-ENTMCNC: 33 % (ref 32–36)
MCV RBC AUTO: 85.6 FL (ref 80–100)
MONOCYTE #: 0.8 TH/MM3 (ref 0–0.9)
MONOCYTES NFR BLD: 5.9 % (ref 0–8)
NEUTROPHILS # BLD AUTO: 11.8 TH/MM3 (ref 1.8–7.7)
NEUTROPHILS NFR BLD AUTO: 89.2 % (ref 16–70)
PLATELET # BLD: 169 TH/MM3 (ref 150–450)
PMV BLD AUTO: 8.7 FL (ref 7–11)
RBC # BLD AUTO: 4.3 MIL/MM3 (ref 4–5.3)
SODIUM SERPL-SCNC: 136 MEQ/L (ref 136–145)
SODIUM SERPL-SCNC: 139 MEQ/L (ref 136–145)
WBC # BLD AUTO: 13.2 TH/MM3 (ref 4–11)

## 2018-04-23 RX ADMIN — ACYCLOVIR SCH UNITS: 800 TABLET ORAL at 20:57

## 2018-04-23 RX ADMIN — HUMAN INSULIN SCH: 100 INJECTION, SOLUTION SUBCUTANEOUS at 20:58

## 2018-04-23 RX ADMIN — CARVEDILOL SCH MG: 6.25 TABLET, FILM COATED ORAL at 20:57

## 2018-04-23 RX ADMIN — HUMAN INSULIN SCH: 100 INJECTION, SOLUTION SUBCUTANEOUS at 17:45

## 2018-04-23 RX ADMIN — ENOXAPARIN SODIUM SCH MG: 40 INJECTION SUBCUTANEOUS at 12:00

## 2018-04-23 RX ADMIN — GUAIFENESIN SCH MG: 600 TABLET, EXTENDED RELEASE ORAL at 09:18

## 2018-04-23 RX ADMIN — AMIODARONE HYDROCHLORIDE SCH MG: 200 TABLET ORAL at 20:57

## 2018-04-23 RX ADMIN — STANDARDIZED SENNA CONCENTRATE AND DOCUSATE SODIUM SCH TAB: 8.6; 5 TABLET, FILM COATED ORAL at 09:00

## 2018-04-23 RX ADMIN — HUMAN INSULIN SCH: 100 INJECTION, SOLUTION SUBCUTANEOUS at 12:00

## 2018-04-23 RX ADMIN — Medication SCH ML: at 20:58

## 2018-04-23 RX ADMIN — IPRATROPIUM BROMIDE AND ALBUTEROL SULFATE SCH AMPULE: .5; 3 SOLUTION RESPIRATORY (INHALATION) at 12:00

## 2018-04-23 RX ADMIN — IPRATROPIUM BROMIDE AND ALBUTEROL SULFATE SCH AMPULE: .5; 3 SOLUTION RESPIRATORY (INHALATION) at 22:35

## 2018-04-23 RX ADMIN — BUDESONIDE AND FORMOTEROL FUMARATE DIHYDRATE SCH PUFF: 80; 4.5 AEROSOL RESPIRATORY (INHALATION) at 20:58

## 2018-04-23 RX ADMIN — METHYLPREDNISOLONE SODIUM SUCCINATE SCH MG: 40 INJECTION, POWDER, FOR SOLUTION INTRAMUSCULAR; INTRAVENOUS at 09:18

## 2018-04-23 RX ADMIN — HUMAN INSULIN SCH: 100 INJECTION, SOLUTION SUBCUTANEOUS at 09:18

## 2018-04-23 RX ADMIN — ACYCLOVIR SCH UNITS: 800 TABLET ORAL at 09:00

## 2018-04-23 RX ADMIN — STANDARDIZED SENNA CONCENTRATE AND DOCUSATE SODIUM SCH TAB: 8.6; 5 TABLET, FILM COATED ORAL at 21:00

## 2018-04-23 RX ADMIN — Medication SCH ML: at 03:15

## 2018-04-23 RX ADMIN — METHYLPREDNISOLONE SODIUM SUCCINATE SCH MG: 40 INJECTION, POWDER, FOR SOLUTION INTRAMUSCULAR; INTRAVENOUS at 20:57

## 2018-04-23 RX ADMIN — GUAIFENESIN SCH MG: 600 TABLET, EXTENDED RELEASE ORAL at 20:57

## 2018-04-23 RX ADMIN — DABIGATRAN ETEXILATE MESYLATE SCH MG: 150 CAPSULE ORAL at 22:46

## 2018-04-23 RX ADMIN — LEVOFLOXACIN SCH MG: 500 TABLET, FILM COATED ORAL at 09:18

## 2018-04-23 RX ADMIN — IPRATROPIUM BROMIDE AND ALBUTEROL SULFATE SCH AMPULE: .5; 3 SOLUTION RESPIRATORY (INHALATION) at 08:24

## 2018-04-23 RX ADMIN — METHYLPREDNISOLONE SODIUM SUCCINATE SCH MG: 40 INJECTION, POWDER, FOR SOLUTION INTRAMUSCULAR; INTRAVENOUS at 03:14

## 2018-04-23 NOTE — HHI.FF
Face to Face Verification


Diagnosis:  


(1) Muscular deconditioning


(2) COPD exacerbation


(3) Insulin dependent diabetes mellitus


Physical Therapy


Order:  Evaluate and Treat, Improve ambulation, Strength and gait training











I have seen patient Kinsey Garcia on 4/23/18. My clinical findings support 

the need for the requested home health care services because:








 Patient has SOB





 Deconditioned w/ increased weakness





 High risk of falls














I certify that my clinical findings support that this patient is homebound 

because:








 Hx COPD- exertion dyspnea/weakness





 Unsteady gait/balance

















Sarah Sheridan MD Apr 23, 2018 09:25

## 2018-04-23 NOTE — MB
cc:

Cesar Connors MD

****

 

 

DATE:

04/23/2018

 

REASON FOR CONSULTATION:

Chest pain, atrial fibrillation.

 

HISTORY OF PRESENT ILLNESS:

The patient is a very pleasant 82-year-old white female with a history

of diabetes, COPD, right breast cancer, who was in her usual state of 

health up until about 2 days ago when she began to experience 

increasing shortness of breath.  She was admitted for possible COPD 

exacerbation.  Today at about 1:30 p.m., she developed an episode of 

sharp substernal chest pain associated with shortness of breath 

without nausea or diaphoresis.  She believes the chest discomfort 

lasted about 10 minutes at most.  At the same time, she was in atrial 

fibrillation with a rapid ventricular response.  The patient denies 

palpitations, dizziness, syncope, near syncope, pedal edema, 

paroxysmal nocturnal dyspnea.  She reports cardiac catheterization 6 

years ago in [place], which was normal.  Since coming into the hospital,

her dyspnea has improved.

 

PAST MEDICAL HISTORY:

1.  COPD.

2.  Diabetes.

3.  Right breast cancer, status post lumpectomy, radiation therapy, 

hormonal therapy.

 

PAST SURGICAL HISTORY:

1.  Right breast lumpectomy.

2.  Hernia repair.

3.  Right rotator cuff repair.

 

CURRENT CARDIAC MEDICATIONS:

Carvedilol 6.25 mg p.o. q. 12 hours, Lovenox 40 mg subcutaneously q. 

24 hours.

 

ALLERGIES:

PENICILLIN, SULFA.

 

FAMILY HISTORY:

Noncontributory.

 

SOCIAL HISTORY:

The patient smokes about a pack of cigarettes per day.  She denies 

alcohol abuse.

 

REVIEW OF SYSTEMS:

As in the history of present illness, otherwise negative or 

noncontributory.  She also denies headache, unilateral weakness or 

numbness, abdominal pain, melena, dyspepsia, bright red blood per 

rectum, recent flu symptoms.

 

PHYSICAL EXAMINATION:

VITAL SIGNS:  Her blood pressure 138/70 with a pulse of 95, 

respirations 20.

GENERAL:  She is a well-developed, well-nourished white female, in no 

acute distress.

NECK:  Jugular venous pressure is normal.  Carotid pulses are 2+ 

bilaterally and without bruits.

CHEST:  Reveals diminished breath sounds diffusely.  There are no 

definite rales.

CARDIAC:  She has a regular rhythm and rate without S3, S4, or murmur.

ABDOMEN:  She has a soft, nontender abdomen.  Bowel sounds are 

present.  There is no definite hepatosplenomegaly.

EXTREMITIES:  Reveals no clubbing, cyanosis or edema.

 

DIAGNOSTIC DATA:

EKG from today shows atrial fibrillation with a rapid ventricular 

response, inferior and lateral ST abnormality, consider ischemia.

 

Chest x-ray shows no acute disease.

 

LABORATORY DATA:

Includes WBC 13.2, hemoglobin 12.1, platelets 169.  Potassium 4.5, BUN

35, creatinine 0.99.  , troponin less than 0.02.

 

IMPRESSION:

Paroxysmal atrial fibrillation with a rapid ventricular response, 

single episode of atypical chest pain in this 82-year-old white female

with a history of diabetes, COPD, breast cancer.  At this time, she is

back in sinus rhythm.  The patient denies any other recent chest 

pains.  She reports a normal cardiac catheterization about 6 years ago

at the age of 76.  Initial cardiac enzymes are negative for myocardial

infarction.  With respect to her thromboembolic risk, it is at least 

moderately elevated with her advanced age, diabetes.

 

RECOMMENDATIONS:

1.  Start amiodarone to help maintain sinus rhythm.

2.  After discussion with her daughter, who is a nurse, they would 

prefer anticoagulation therapy with Pradaxa.

3.  Check a 2-D echo to assess her left ventricular function.

4.  Continue beta-blocker therapy.

 

 

__________________________________

MD TERE Argueta/JAMIN

D: 04/23/2018, 04:41 PM

T: 04/23/2018, 06:18 PM

Visit #: [id number]

Job #: [id number]

[email or web address]

## 2018-04-23 NOTE — HHI.PR
Subjective


Remarks


Pt seen and examined. VS reviewed.  Requiring 3 L supplemental O2.  Reports 

continued improvements in her breathing but still SOB with exertional. Spoke 

with patient's daughter again on the phone today who reports her exertional 

dyspnea has been longstanding and she will "burnett and puff 10 feet to go outside 

and smoker her cigarettes." Patient reports she is feeling better and denies CP

, abdominal pain, N/V. She is tolerating PO.





Objective





Vital Signs








  Date Time  Temp Pulse Resp B/P (MAP) Pulse Ox O2 Delivery O2 Flow Rate FiO2


 


4/23/18 08:24     96  3.00 


 


4/23/18 08:07 98.0 80 19 138/66 (90) 97   


 


4/23/18 04:00 98.2 81 20 128/62 (84) 97   


 


4/23/18 03:41  80      


 


4/23/18 00:00 97.7 81 20 134/78 (96) 97   


 


4/22/18 23:42  84      


 


4/22/18 20:18     95 Nasal Cannula 3.00 


 


4/22/18 20:00 98.6 104 20 149/70 (96) 94   


 


4/22/18 19:43  110      


 


4/22/18 16:00  98      


 


4/22/18 16:00 98.9 101 20 123/57 (79) 95   


 


4/22/18 12:00 98.1 105 19 113/58 (76) 96   


 


4/22/18 12:00  105      














I/O      


 


 4/22/18 4/22/18 4/22/18 4/23/18 4/23/18 4/23/18





 07:00 15:00 23:00 07:00 15:00 23:00


 


Intake Total 600 ml  680 ml 380 ml  


 


Output Total   500 ml   


 


Balance 600 ml  180 ml 380 ml  


 


      


 


Intake Oral 600 ml  680 ml 380 ml  


 


Output Urine Total   500 ml   


 


# Voids 4   3  


 


# Bowel Movements 0  1   








Result Diagram:  


4/23/18 0330                                                                   

             4/23/18 0330





Objective Remarks


GENERAL: Pleasant, obese Nigerian female sitting up in bed eating breakfast.


SKIN: Warm and dry.


HEENT: AT/NC. Pupils equal and round. MMM.


HEART: RRR no m/r/g.


LUNGS: Diminished breath sounds with expiratory wheezing and prolonged 

expiratory phase. Improved aeration though from yesterday.


ABDOMEN: +BS, soft, NT, ND.


EXTREMITIES: No LE edema. Diminished but palpable pedal pulses.


NEURO: Awake and alert. Nonfocal.


PSYCH: Appropriate mood and affect.





A/P


Assessment and Plan


82 year old [place] female with COPD, IDDM, tobacco abuse, and history of breast 

cancer admitted on 4/21 for acute COPD exacerbation.





1. COPD exacerbation


- Desatted to 78% on room air in the ED


- Maintaining adequate sats on 3 L NC


- DuoNeb Q4H scheduled 


- Decrease SoluMedrol to 40 IV Q12 and plan to change to PO tomorrow


- Start on Symbicort and provide Rx on discharge as well as albuterol rescue 

inhaler


- White cell count bumped from 5.9 to 11 likely secondary to steroid 

demargination but added Levaquin for coverage of exacerbation since severe 

enough to require hospitalization


- Counseled extensively on the need to be compliant with maintenance inhalers 

and the absolute need to stop smoking





2. Hypoxia


- ABG on admission: pH 7.33, PCO2 54, PO2 70, HCO3 28


- Supplemental O2 PRN


- D-dimer elevated given hypoxia and h/o breast cancer but CTA negative for 

embolism


- Respiratory walk test since patient anticipated to be discharged tomorrow





3. Insulin dependent DM


- SSI per protocol


- Blood sugars have been elevated in 200-400 range, likely secondary to steroid 

use


- Change to diabetic diet


- Increase Levemir to 35 units BID





4. Tobacco abuse


- Refusing nicotine patch


- Ativan PRN withdraw symptoms


- Counseled extensively on cessation





DVT prophylaxis: SCDs/teds/Lovenox SQ


Discharge Planning


Anticipate D/C tomorrow











Sarah Sheridan MD Apr 23, 2018 09:25

## 2018-04-23 NOTE — HHI.PR
Subjective


Remarks


not seen





Objective


Vitals





Vital Signs








  Date Time  Temp Pulse Resp B/P (MAP) Pulse Ox O2 Delivery O2 Flow Rate FiO2


 


4/23/18 16:00  95      


 


4/23/18 16:00 98.4 95 43 138/71 (93) 96   


 


4/23/18 15:00  104 60 138/78 (98) 97   


 


4/23/18 14:56 98.4 102 22 131/75 (93) 97   


 


4/23/18 12:22 98.1 91 17 155/64 (94) 94   


 


4/23/18 11:33       3.00 


 


4/23/18 08:24     96  3.00 


 


4/23/18 08:13  86      


 


4/23/18 08:07 98.0 80 19 138/66 (90) 97   


 


4/23/18 04:00 98.2 81 20 128/62 (84) 97   


 


4/23/18 03:41  80      


 


4/23/18 00:00 97.7 81 20 134/78 (96) 97   


 


4/22/18 23:42  84      


 


4/22/18 20:18     95 Nasal Cannula 3.00 


 


4/22/18 20:00 98.6 104 20 149/70 (96) 94   


 


4/22/18 19:43  110      














I/O      


 


 4/22/18 4/22/18 4/22/18 4/23/18 4/23/18 4/23/18





 06:59 14:59 22:59 06:59 14:59 22:59


 


Intake Total 600 ml  680 ml 380 ml  


 


Output Total   500 ml   


 


Balance 600 ml  180 ml 380 ml  


 


      


 


Intake Oral 600 ml  680 ml 380 ml  


 


Output Urine Total   500 ml   


 


# Voids 4   3  


 


# Bowel Movements 0  1   








Result Diagram:  


4/23/18 0330                                                                   

             4/23/18 1407





Imaging





Last Impressions








Chest X-Ray 4/21/18 0906 Signed





Impressions: 





 Service Date/Time:  Saturday, April 21, 2018 09:34 - CONCLUSION: No acute 





 disease.       Zacarias Magaña MD 


 


CT Angiography 4/21/18 0000 Signed





Impressions: 





 Service Date/Time:  Saturday, April 21, 2018 17:37 - CONCLUSION:  1. No 

filling 





 defects to suggest pulmonary embolic disease. No effusions or adenopathy.      





 Gold Wilde MD 








Objective Remarks


GENERAL: Pleasant, obese Bulgarian female sitting up in bed eating breakfast.


SKIN: Warm and dry.


HEENT: AT/NC. Pupils equal and round. MMM.


HEART: RRR no m/r/g.


LUNGS: Diminished breath sounds with expiratory wheezing and prolonged 

expiratory phase. Improved aeration though from yesterday.


ABDOMEN: +BS, soft, NT, ND.


EXTREMITIES: No LE edema. Diminished but palpable pedal pulses.


NEURO: Awake and alert. Nonfocal.


PSYCH: Appropriate mood and affect.


Procedures


none





A/P


Problem List:  


(1) Insulin dependent diabetes mellitus


ICD Code:  E11.9 - Type 2 diabetes mellitus without complications; Z79.4 - Long 

term (current) use of insulin


(2) GERD (gastroesophageal reflux disease)


ICD Code:  K21.9 - Gastro-esophageal reflux disease without esophagitis


(3) COPD exacerbation


ICD Code:  J44.1 - Chronic obstructive pulmonary disease with (acute) 

exacerbation


Status:  Acute


Assessment and Plan


82 year old [place]ian female with COPD, IDDM, tobacco abuse, and history of breast 

cancer admitted on 4/21 for acute COPD exacerbation.





1. COPD exacerbation


- Desatted to 78% on room air in the ED


- Maintaining adequate sats on 3 L NC


- DuoNeb Q4H scheduled 


- Decrease SoluMedrol to 40 IV Q12 and plan to change to PO tomorrow


- Start on Symbicort and provide Rx on discharge as well as albuterol rescue 

inhaler


- White cell count bumped from 5.9 to 11 likely secondary to steroid 

demargination but added Levaquin for coverage of exacerbation since severe 

enough to require hospitalization


- Counseled extensively on the need to be compliant with maintenance inhalers 

and the absolute need to stop smoking





2. Hypoxia


- ABG on admission: pH 7.33, PCO2 54, PO2 70, HCO3 28


- Supplemental O2 PRN


- D-dimer elevated given hypoxia and h/o breast cancer but CTA negative for 

embolism


- Respiratory walk test since patient anticipated to be discharged tomorrow





3. Insulin dependent DM


- SSI per protocol


- Blood sugars have been elevated in 200-400 range, likely secondary to steroid 

use


- Change to diabetic diet


- Increase Levemir to 35 units BID





4. Tobacco abuse


- Refusing nicotine patch


- Ativan PRN withdraw symptoms


- Counseled extensively on cessation





5. Fib with RVR





6.  GPC bacteremia





DVT prophylaxis: SCDs/teds/Lovenox SQ











Emory Marcano MD Apr 23, 2018 18:50

## 2018-04-23 NOTE — EKG
Date Performed: 04/23/2018       Time Performed: 13:36:56

 

PTAGE:      82 years

 

EKG:      Atrial fibrillation with uncontrolled ventricular response. Extensive ST-T changes may be d
ue to myocardial ischemia Abnormal ECG

 

PREVIOUS TRACING       : 04/21/2018 09.00 Compared to previous tracing, atrial fibrillation has repla
tran Sinus rhythm 

 

, heart rate has increased, inferior and lateral ST changes are now present.

 

DOCTOR:   Cesar Connors  Interpretating Date/Time  04/23/2018 21:58:49

## 2018-04-24 VITALS
HEART RATE: 85 BPM | RESPIRATION RATE: 31 BRPM | OXYGEN SATURATION: 91 % | DIASTOLIC BLOOD PRESSURE: 93 MMHG | SYSTOLIC BLOOD PRESSURE: 144 MMHG

## 2018-04-24 VITALS — HEART RATE: 84 BPM

## 2018-04-24 VITALS — RESPIRATION RATE: 45 BRPM | HEART RATE: 79 BPM | OXYGEN SATURATION: 97 %

## 2018-04-24 VITALS
OXYGEN SATURATION: 92 % | TEMPERATURE: 98.1 F | RESPIRATION RATE: 20 BRPM | HEART RATE: 77 BPM | DIASTOLIC BLOOD PRESSURE: 64 MMHG | SYSTOLIC BLOOD PRESSURE: 126 MMHG

## 2018-04-24 VITALS
HEART RATE: 81 BPM | RESPIRATION RATE: 36 BRPM | SYSTOLIC BLOOD PRESSURE: 140 MMHG | DIASTOLIC BLOOD PRESSURE: 104 MMHG | TEMPERATURE: 98.6 F | OXYGEN SATURATION: 89 %

## 2018-04-24 VITALS
SYSTOLIC BLOOD PRESSURE: 154 MMHG | OXYGEN SATURATION: 96 % | TEMPERATURE: 98.6 F | DIASTOLIC BLOOD PRESSURE: 67 MMHG | RESPIRATION RATE: 26 BRPM | HEART RATE: 62 BPM

## 2018-04-24 VITALS
HEART RATE: 72 BPM | SYSTOLIC BLOOD PRESSURE: 141 MMHG | RESPIRATION RATE: 43 BRPM | OXYGEN SATURATION: 91 % | DIASTOLIC BLOOD PRESSURE: 64 MMHG

## 2018-04-24 VITALS
DIASTOLIC BLOOD PRESSURE: 76 MMHG | SYSTOLIC BLOOD PRESSURE: 136 MMHG | OXYGEN SATURATION: 97 % | HEART RATE: 80 BPM | RESPIRATION RATE: 44 BRPM

## 2018-04-24 VITALS — OXYGEN SATURATION: 87 % | RESPIRATION RATE: 41 BRPM | HEART RATE: 78 BPM

## 2018-04-24 VITALS
HEART RATE: 88 BPM | SYSTOLIC BLOOD PRESSURE: 131 MMHG | TEMPERATURE: 97.6 F | DIASTOLIC BLOOD PRESSURE: 62 MMHG | RESPIRATION RATE: 19 BRPM | OXYGEN SATURATION: 93 %

## 2018-04-24 VITALS
RESPIRATION RATE: 52 BRPM | TEMPERATURE: 98.9 F | DIASTOLIC BLOOD PRESSURE: 63 MMHG | HEART RATE: 78 BPM | SYSTOLIC BLOOD PRESSURE: 130 MMHG

## 2018-04-24 VITALS — RESPIRATION RATE: 20 BRPM

## 2018-04-24 VITALS — HEART RATE: 66 BPM

## 2018-04-24 VITALS — OXYGEN SATURATION: 95 %

## 2018-04-24 VITALS — OXYGEN SATURATION: 98 %

## 2018-04-24 VITALS — OXYGEN SATURATION: 91 %

## 2018-04-24 VITALS — HEART RATE: 64 BPM

## 2018-04-24 LAB
BASOPHILS # BLD AUTO: 0 TH/MM3 (ref 0–0.2)
BASOPHILS NFR BLD: 0.2 % (ref 0–2)
BUN SERPL-MCNC: 33 MG/DL (ref 7–18)
CALCIUM SERPL-MCNC: 8.6 MG/DL (ref 8.5–10.1)
CHLORIDE SERPL-SCNC: 102 MEQ/L (ref 98–107)
CREAT SERPL-MCNC: 0.78 MG/DL (ref 0.5–1)
EOSINOPHIL # BLD: 0 TH/MM3 (ref 0–0.4)
EOSINOPHIL NFR BLD: 0 % (ref 0–4)
ERYTHROCYTE [DISTWIDTH] IN BLOOD BY AUTOMATED COUNT: 15.4 % (ref 11.6–17.2)
GFR SERPLBLD BASED ON 1.73 SQ M-ARVRAT: 71 ML/MIN (ref 89–?)
GLUCOSE SERPL-MCNC: 186 MG/DL (ref 74–106)
HCO3 BLD-SCNC: 30 MEQ/L (ref 21–32)
HCT VFR BLD CALC: 37.2 % (ref 35–46)
HGB BLD-MCNC: 12.3 GM/DL (ref 11.6–15.3)
LYMPHOCYTES # BLD AUTO: 0.8 TH/MM3 (ref 1–4.8)
LYMPHOCYTES NFR BLD AUTO: 7.3 % (ref 9–44)
MCH RBC QN AUTO: 28.6 PG (ref 27–34)
MCHC RBC AUTO-ENTMCNC: 33 % (ref 32–36)
MCV RBC AUTO: 86.7 FL (ref 80–100)
MONOCYTE #: 0.8 TH/MM3 (ref 0–0.9)
MONOCYTES NFR BLD: 7.5 % (ref 0–8)
NEUTROPHILS # BLD AUTO: 9.1 TH/MM3 (ref 1.8–7.7)
NEUTROPHILS NFR BLD AUTO: 85 % (ref 16–70)
PLATELET # BLD: 154 TH/MM3 (ref 150–450)
PMV BLD AUTO: 8.7 FL (ref 7–11)
RBC # BLD AUTO: 4.29 MIL/MM3 (ref 4–5.3)
SODIUM SERPL-SCNC: 139 MEQ/L (ref 136–145)
WBC # BLD AUTO: 10.7 TH/MM3 (ref 4–11)

## 2018-04-24 RX ADMIN — IPRATROPIUM BROMIDE AND ALBUTEROL SULFATE SCH AMPULE: .5; 3 SOLUTION RESPIRATORY (INHALATION) at 20:36

## 2018-04-24 RX ADMIN — HUMAN INSULIN SCH: 100 INJECTION, SOLUTION SUBCUTANEOUS at 22:15

## 2018-04-24 RX ADMIN — IPRATROPIUM BROMIDE AND ALBUTEROL SULFATE SCH AMPULE: .5; 3 SOLUTION RESPIRATORY (INHALATION) at 08:14

## 2018-04-24 RX ADMIN — DABIGATRAN ETEXILATE MESYLATE SCH MG: 150 CAPSULE ORAL at 22:05

## 2018-04-24 RX ADMIN — BUDESONIDE AND FORMOTEROL FUMARATE DIHYDRATE SCH PUFF: 80; 4.5 AEROSOL RESPIRATORY (INHALATION) at 22:01

## 2018-04-24 RX ADMIN — IPRATROPIUM BROMIDE AND ALBUTEROL SULFATE SCH AMPULE: .5; 3 SOLUTION RESPIRATORY (INHALATION) at 11:40

## 2018-04-24 RX ADMIN — LEVOFLOXACIN SCH MG: 500 TABLET, FILM COATED ORAL at 08:46

## 2018-04-24 RX ADMIN — CARVEDILOL SCH MG: 6.25 TABLET, FILM COATED ORAL at 08:46

## 2018-04-24 RX ADMIN — METHYLPREDNISOLONE SODIUM SUCCINATE SCH MG: 40 INJECTION, POWDER, FOR SOLUTION INTRAMUSCULAR; INTRAVENOUS at 22:02

## 2018-04-24 RX ADMIN — BUDESONIDE AND FORMOTEROL FUMARATE DIHYDRATE SCH PUFF: 80; 4.5 AEROSOL RESPIRATORY (INHALATION) at 08:47

## 2018-04-24 RX ADMIN — AMIODARONE HYDROCHLORIDE SCH MG: 200 TABLET ORAL at 08:47

## 2018-04-24 RX ADMIN — ACYCLOVIR SCH UNITS: 800 TABLET ORAL at 22:06

## 2018-04-24 RX ADMIN — HUMAN INSULIN SCH: 100 INJECTION, SOLUTION SUBCUTANEOUS at 12:00

## 2018-04-24 RX ADMIN — GUAIFENESIN SCH MG: 600 TABLET, EXTENDED RELEASE ORAL at 08:47

## 2018-04-24 RX ADMIN — STANDARDIZED SENNA CONCENTRATE AND DOCUSATE SODIUM SCH TAB: 8.6; 5 TABLET, FILM COATED ORAL at 22:05

## 2018-04-24 RX ADMIN — CARVEDILOL SCH MG: 6.25 TABLET, FILM COATED ORAL at 22:05

## 2018-04-24 RX ADMIN — Medication SCH ML: at 22:02

## 2018-04-24 RX ADMIN — GUAIFENESIN SCH MG: 600 TABLET, EXTENDED RELEASE ORAL at 22:05

## 2018-04-24 RX ADMIN — HUMAN INSULIN SCH: 100 INJECTION, SOLUTION SUBCUTANEOUS at 17:21

## 2018-04-24 RX ADMIN — Medication SCH ML: at 08:47

## 2018-04-24 RX ADMIN — DABIGATRAN ETEXILATE MESYLATE SCH MG: 150 CAPSULE ORAL at 08:46

## 2018-04-24 RX ADMIN — HUMAN INSULIN SCH: 100 INJECTION, SOLUTION SUBCUTANEOUS at 08:00

## 2018-04-24 RX ADMIN — AMIODARONE HYDROCHLORIDE SCH MG: 200 TABLET ORAL at 22:05

## 2018-04-24 RX ADMIN — ACYCLOVIR SCH UNITS: 800 TABLET ORAL at 08:47

## 2018-04-24 RX ADMIN — STANDARDIZED SENNA CONCENTRATE AND DOCUSATE SODIUM SCH TAB: 8.6; 5 TABLET, FILM COATED ORAL at 08:47

## 2018-04-24 RX ADMIN — IPRATROPIUM BROMIDE AND ALBUTEROL SULFATE SCH AMPULE: .5; 3 SOLUTION RESPIRATORY (INHALATION) at 16:50

## 2018-04-24 RX ADMIN — METHYLPREDNISOLONE SODIUM SUCCINATE SCH MG: 40 INJECTION, POWDER, FOR SOLUTION INTRAMUSCULAR; INTRAVENOUS at 08:47

## 2018-04-24 NOTE — PD.CARD.PN
Subjective


Subjective Remarks


No further CP.  No dizziness, palpitations.  Dyspnea "much better".





Objective


Medications











Item Value  Date Time


 


Carvedilol 6.25 mg 4/23/18 2100





 (Coreg) Q12HR/PO 4/23/18 2057


 


Amiodarone HCl 400 mg 4/23/18 2100





 (Cordarone) Q12HR/PO 4/23/18 2057


 


Dabigatran 150 mg 4/23/18 2100





 (Pradaxa) BID/PO 4/23/18 2246











Current Medications








 Medications


  (Trade)  Dose


 Ordered  Sig/Damaso


 Route  Start Time


 Stop Time Status Last Admin


 


  (NS Flush)  2 ml  UNSCH  PRN


 IV FLUSH  4/21/18 11:15


    4/22/18 03:21


 


 


  (NS Flush)  2 ml  BID


 IV FLUSH  4/21/18 21:00


    4/23/18 20:58


 


 


  (Tylenol)  650 mg  Q4H  PRN


 PO  4/21/18 11:15


    4/22/18 08:13


 


 


  (Zofran Inj)  4 mg  Q6H  PRN


 IVP  4/21/18 11:15


     


 


 


  (Narcan Inj)  0.4 mg  UNSCH  PRN


 IV PUSH  4/21/18 11:15


     


 


 


  (Sarah-Colace)  1 tab  BID


 PO  4/21/18 21:00


     


 


 


  (Milk Of


 Magnesia Liq)  30 ml  Q12H  PRN


 PO  4/21/18 11:15


     


 


 


  (Senokot)  17.2 mg  Q12H  PRN


 PO  4/21/18 11:15


     


 


 


  (Dulcolax Supp)  10 mg  DAILY  PRN


 RECTAL  4/21/18 11:15


     


 


 


  (Lactulose Liq)  30 ml  DAILY  PRN


 PO  4/21/18 11:15


     


 


 


  (Duoneb Neb)  1 ampule  Q4HR WHILE AWAKE  NEB


 NEB  4/21/18 12:00


    4/23/18 22:35


 


 


  (Duoneb Neb)  1 ampule  Q2HR NEB  PRN


 NEB  4/21/18 11:15


     


 


 


  (D50w (Vial) Inj)  50 ml  UNSCH  PRN


 IV PUSH  4/21/18 11:30


     


 


 


  (Glucagon Inj)  1 mg  UNSCH  PRN


 OTHER  4/21/18 11:30


     


 


 


  (NovoLIN R


 SUPPLEMENTAL


 SCALE)  1  ACHS SLIDING  SCALE


 SQ  4/21/18 12:00


    4/23/18 20:58


 


 


  (Ativan)  0.5 mg  Q12H  PRN


 PO  4/21/18 18:30


     


 


 


  (Levaquin)  500 mg  DAILY


 PO  4/22/18 09:00


 4/28/18 08:59  4/23/18 09:18


 


 


  (Mucinex Er)  600 mg  BID


 PO  4/22/18 21:00


    4/23/18 20:57


 


 


  (SoluMEDROL INJ)  40 mg  Q12HR


 IV PUSH  4/23/18 21:00


    4/23/18 20:57


 


 


  (Levemir Inj)  35 units  BID


 SQ  4/23/18 21:00


    4/23/18 20:57


 


 


  (Symbicort


 80-4.5 Mcg Inh)  1 puff  Q12HR


 INH  4/23/18 21:00


    4/23/18 20:58


 


 


  (Coreg)  6.25 mg  Q12HR


 PO  4/23/18 21:00


    4/23/18 20:57


 


 


  (Cordarone)  400 mg  Q12HR


 PO  4/23/18 21:00


    4/23/18 20:57


 


 


  (Pradaxa)  150 mg  BID


 PO  4/23/18 21:00


    4/23/18 22:46


 


 


  (Glucophage)  1,000 mg  BIDPC


 PO  4/23/18 19:00


     


 








Vital Signs / I&O





Vital Signs








  Date Time  Temp Pulse Resp B/P (MAP) Pulse Ox O2 Delivery O2 Flow Rate FiO2


 


4/24/18 06:00  64      


 


4/24/18 04:00 98.6 62 26 154/67 (96) 96   


 


4/24/18 04:00  62      


 


4/24/18 02:00  66      


 


4/24/18 00:00  85 31 144/93 (110) 91   


 


4/24/18 00:00  85      


 


4/23/18 22:00  84      


 


4/23/18 21:10     97 Nasal Cannula 3.00 


 


4/23/18 20:00  86      


 


4/23/18 20:00 98.7 86 50 119/70 (86) 97   


 


4/23/18 18:00  92      


 


4/23/18 16:00  95      


 


4/23/18 16:00 98.4 95 43 138/71 (93) 96   


 


4/23/18 15:00  104 60 138/78 (98) 97   


 


4/23/18 14:56 98.4 102 22 131/75 (93) 97   


 


4/23/18 12:22 98.1 91 17 155/64 (94) 94   


 


4/23/18 11:33       3.00 


 


4/23/18 08:24     96  3.00 


 


4/23/18 08:13  86      


 


4/23/18 08:07 98.0 80 19 138/66 (90) 97   














I/O      


 


 4/23/18 4/23/18 4/23/18 4/24/18 4/24/18 4/24/18





 07:00 15:00 23:00 07:00 15:00 23:00


 


Intake Total 380 ml  480 ml 300 ml  


 


Balance 380 ml  480 ml 300 ml  


 


      


 


Intake Oral 380 ml  480 ml 300 ml  


 


# Voids 3   3  








Physical Exam


GENERAL: Well developed, well nourished. No acute distress.


HEENT: Jugular venous pressure is normal. 


CHEST: Diminished breath sounds diffusely.


CARDIAC: Regular rate and rhythm without S3, S4, or murmur.


ABDOMEN: Soft, nontender, no hepatosplenomegaly. Bowel sounds present.


EXTREMITIES: No clubbing, cyanosis, or edema.


Laboratory





Laboratory Tests








Test


  4/23/18


13:30 4/23/18


14:07 4/23/18


21:02 4/24/18


04:09


 


Blood Gas Puncture Site RT RADIAL    


 


Blood Gas Patient Temperature 98.6    


 


Blood Gas HCO3 27 mmol/L    


 


Blood Gas Base Excess 3.2 mmol/L    


 


Blood Gas Oxygen Saturation 95 %    


 


Arterial Blood pH 7.43    


 


Arterial Blood Partial


Pressure CO2 42 mmHg 


  


  


  


 


 


Arterial Blood Partial


Pressure O2 86 mmHg 


  


  


  


 


 


Arterial Blood Oxygen Content 17.2 Vol %    


 


Arterial Blood


Carboxyhemoglobin 1.2 % 


  


  


  


 


 


Arterial Blood Methemoglobin 1.1 %    


 


Blood Gas Hemoglobin 12.9 G/DL    


 


Oxygen Delivery Device NASAL CANNULA    


 


Blood Gas Liter Flow 3 L/M    


 


Blood Urea Nitrogen  35 MG/DL   33 MG/DL 


 


Creatinine  0.99 MG/DL   0.78 MG/DL 


 


Random Glucose  354 MG/DL   186 MG/DL 


 


Calcium Level  8.9 MG/DL   8.6 MG/DL 


 


Sodium Level  136 MEQ/L   139 MEQ/L 


 


Potassium Level  4.5 MEQ/L   4.3 MEQ/L 


 


Chloride Level  100 MEQ/L   102 MEQ/L 


 


Carbon Dioxide Level  29.0 MEQ/L   30.0 MEQ/L 


 


Anion Gap  7 MEQ/L   7 MEQ/L 


 


Estimat Glomerular Filtration


Rate 


  54 ML/MIN 


  


  71 ML/MIN 


 


 


Total Creatine Kinase  114 U/L   


 


Creatine Kinase MB  3.2 NG/ML   


 


Troponin I


  


  LESS THAN 0.02


NG/ML LESS THAN 0.02


NG/ML 


 


 


Thyroid Stimulating Hormone


3rd Gen 


  1.090 uIU/ML 


  


  


 


 


White Blood Count    10.7 TH/MM3 


 


Red Blood Count    4.29 MIL/MM3 


 


Hemoglobin    12.3 GM/DL 


 


Hematocrit    37.2 % 


 


Mean Corpuscular Volume    86.7 FL 


 


Mean Corpuscular Hemoglobin    28.6 PG 


 


Mean Corpuscular Hemoglobin


Concent 


  


  


  33.0 % 


 


 


Red Cell Distribution Width    15.4 % 


 


Platelet Count    154 TH/MM3 


 


Mean Platelet Volume    8.7 FL 


 


Neutrophils (%) (Auto)    85.0 % 


 


Lymphocytes (%) (Auto)    7.3 % 


 


Monocytes (%) (Auto)    7.5 % 


 


Eosinophils (%) (Auto)    0.0 % 


 


Basophils (%) (Auto)    0.2 % 


 


Neutrophils # (Auto)    9.1 TH/MM3 


 


Lymphocytes # (Auto)    0.8 TH/MM3 


 


Monocytes # (Auto)    0.8 TH/MM3 


 


Eosinophils # (Auto)    0.0 TH/MM3 


 


Basophils # (Auto)    0.0 TH/MM3 


 


CBC Comment    DIFF FINAL 


 


Differential Comment     











Assessment and Plan


Problem List:  


(1) Paroxysmal atrial fibrillation


ICD Codes:  I48.0 - Paroxysmal atrial fibrillation


Status:  Acute


Plan:  Stable overnight.  No further atrial fibrillation.  Echo pending.  No 

evidence for ACS.   Thromboembolic risk moderately elevated with diabetes, 

advanced age.





REC continue oral Amiodarone, dabigatran


         continue carvedilol


         move out of ICU, possible discharge tomorrow if stable





(2) Chest pain


ICD Codes:  R07.9 - Chest pain, unspecified


Status:  Acute


Plan:  Stable overnight.  No further atypical CP.  Cardiac enzymes negative for 

MI.   Patient reports normal cath 6 years ago.   Rec conservative management 

and work up unless she develops recurrent symptoms.





Code Status


full code


Discussed Condition With


patient and daughter





Problem Qualifiers





(1) Chest pain:  


Qualified Codes:  R07.9 - Chest pain, unspecified








Cesar Connors MD Apr 24, 2018 07:19

## 2018-04-24 NOTE — ECHRPT
Indication:   AFIB/AFLUTTER

 

 CONCLUSIONS

 The left ventricular systolic function is normal with an estimated ejection fraction in the range of
 55-60%. 

 Wall thickness is measured at the upper limits of normal. 

 Mild mitral valve regurgitation. 

 There is mild tricuspid valve regurgitation. 

 

 BP:  154   / 67      HR: 62                       Rhythm:           Sinus

 

 MEASUREMENTS  (Male / Female) Normal Values       Technical Quality:Fair

 2D ECHO

 LV Diastolic Diameter PLAX        4.7 cm                4.2 - 5.9 / 3.9 - 5.3 cm

 LV Systolic Diameter PLAX         3.7 cm                

 IVS Diastolic Thickness           1.0 cm                0.6 - 1.0 / 0.6 - 0.9 cm

 LVPW Diastolic Thickness          1.0 cm                0.6 - 1.0 / 0.6 - 0.9 cm

 LV Relative Wall Thickness        0.4                   

 RV Internal Dim ED PLAX           3.6 cm                

 LVOT Diameter                     1.9 cm                

 LA Systolic Diameter LX           4.2 cm                3.0 - 4.0 / 2.7 - 3.8 cm

 

 M-MODE

 Aortic Root Diameter MM           2.9 cm                

 LA Systolic Diameter MM           3.9 cm                

 LA Ao Ratio MM                    1.3                   

 AV Cusp Separation MM             1.3 cm                

 

 DOPPLER

 AV Peak Velocity                  156.0 cm/s            

 AV Peak Gradient                  9.7 mmHg              

 LVOT Peak Velocity                94.9 cm/s             

 LVOT Peak Gradient                3.6 mmHg              

 AV Area Cont Eq pk                1.7 cm               

 MV Area PHT                       3.0 cm               

 Mitral E Point Velocity           89.8 cm/s             

 Mitral A Point Velocity           91.0 cm/s             

 Mitral E to A Ratio               1.0                   

 LV E' Lateral Velocity            7.0 cm/s              

 Mitral E to LV E' Lateral Ratio   12.8                  

 LV E' Septal Velocity             5.7 cm/s              

 Mitral E to LV E' Septal Ratio    15.9                  

 TR Peak Velocity                  270.0 cm/s            

 TR Peak Gradient                  29.2 mmHg             

 

 

 FINDINGS

 

 LEFT VENTRICLE

 The left ventricular systolic function is normal with an estimated ejection fraction in the range of
 55-60%. 

 Wall thickness is measured at the upper limits of normal. 

 Normal left ventricular size. 

 

 RIGHT VENTRICLE

 Normal right ventricular size and systolic function.  

 

 LEFT ATRIUM

 The left atrial size is mildly dilated. 

 

 RIGHT ATRIUM

 The right atrial size is normal.  

 

 ATRIAL SEPTUM

 Normal atrial septal thickness without atrial level shunting by limited color doppler interrogation.
  

 

 AORTA

 The aortic root and proximal ascending aorta are normal in size on limited imaging.  

 

 MITRAL VALVE

 Mitral annular calcification is present. 

 Mild mitral valve regurgitation. 

 Mild thickening of the mitral valve leaflets. 

 No mitral valve stenosis. 

 

 

 AORTIC VALVE

 Trileaflet aortic valve. 

 Aortic valve sclerosis is present. 

 No aortic valve regurgitation. 

 

 TRICUSPID VALVE

 Structurally normal tricuspid valve. 

 There is mild tricuspid valve regurgitation. 

 The estimated pulmonary arterial pressure is 39 mmHg. 

 

 PULMONARY VALVE

 The pulmonary valve is not well visualized.  

 

 VESSELS

 The inferior vena cava is normal in size.  

 

 PERICARDIUM

 No pericardial effusion.  

 

 

 

 

  Lonnie Rand DO

  (Electronically Signed)

  Final Date:24 April 2018 18:51

## 2018-04-24 NOTE — HHI.FF
Face to Face Verification


Diagnosis:  


(1) Chest pain


(2) Paroxysmal atrial fibrillation


(3) COPD exacerbation


Home Health Nursing








Order: Medical education





 Signs/symptoms of disease process





 Diabetic education





 Oxygen administration education





 Medication education-adverse effect





 Nursing assessment with vital signs

















I have seen patient Kinsey Garcia on 4/24/18. My clinical findings support 

the need for the requested home health care services because:








 Patient has SOB














I certify that my clinical findings support that this patient is homebound 

because:








 Hx COPD- exertion dyspnea/weakness

















Emory Marcano MD Apr 24, 2018 09:01

## 2018-04-24 NOTE — HHI.PR
Subjective


Remarks


F/U COPD.  Breathing better denies shortness of breath but with active wheezing 

on nasal cannula.  Discussed with nursing.





Objective


Vitals





Vital Signs








  Date Time  Temp Pulse Resp B/P (MAP) Pulse Ox O2 Delivery O2 Flow Rate FiO2


 


4/24/18 08:16     98 Nasal Cannula 2.00 


 


4/24/18 06:00  64      


 


4/24/18 04:00 98.6 62 26 154/67 (96) 96   


 


4/24/18 04:00  62      


 


4/24/18 02:00  66      


 


4/24/18 00:00  85 31 144/93 (110) 91   


 


4/24/18 00:00  85      


 


4/23/18 22:00  84      


 


4/23/18 21:10     97 Nasal Cannula 3.00 


 


4/23/18 20:00  86      


 


4/23/18 20:00 98.7 86 50 119/70 (86) 97   


 


4/23/18 18:00  92      


 


4/23/18 16:00  95      


 


4/23/18 16:00 98.4 95 43 138/71 (93) 96   


 


4/23/18 15:00  104 60 138/78 (98) 97   


 


4/23/18 14:56 98.4 102 22 131/75 (93) 97   


 


4/23/18 12:22 98.1 91 17 155/64 (94) 94   


 


4/23/18 11:33       3.00 














I/O      


 


 4/23/18 4/23/18 4/23/18 4/24/18 4/24/18 4/24/18





 07:00 15:00 23:00 07:00 15:00 23:00


 


Intake Total 380 ml  480 ml 300 ml  


 


Balance 380 ml  480 ml 300 ml  


 


      


 


Intake Oral 380 ml  480 ml 300 ml  


 


# Voids 3   3  








Result Diagram:  


4/24/18 0409                                                                   

             4/24/18 0409





Imaging





Last Impressions








Chest X-Ray 4/21/18 0906 Signed





Impressions: 





 Service Date/Time:  Saturday, April 21, 2018 09:34 - CONCLUSION: No acute 





 disease.       Zacarias Magaña MD 


 


CT Angiography 4/21/18 0000 Signed





Impressions: 





 Service Date/Time:  Saturday, April 21, 2018 17:37 - CONCLUSION:  1. No 

filling 





 defects to suggest pulmonary embolic disease. No effusions or adenopathy.      





 Gold Wilde MD 








Objective Remarks


GENERAL: Pleasant, obese Romanian female sitting up eating breakfast.


SKIN: Warm and dry.


HEENT: AT/NC. Pupils equal and round. MMM.


HEART: RRR no m/r/g.


LUNGS: Diminished breath sounds with expiratory wheezing and prolonged 

expiratory phase. 


ABDOMEN: +BS, soft, NT, ND.


EXTREMITIES: No LE edema. Diminished but palpable pedal pulses.


NEURO: Awake and alert. Nonfocal.


PSYCH: Appropriate mood and affect.


Procedures


none





A/P


Problem List:  


(1) Insulin dependent diabetes mellitus


ICD Code:  E11.9 - Type 2 diabetes mellitus without complications; Z79.4 - Long 

term (current) use of insulin


(2) GERD (gastroesophageal reflux disease)


ICD Code:  K21.9 - Gastro-esophageal reflux disease without esophagitis


(3) COPD exacerbation


ICD Code:  J44.1 - Chronic obstructive pulmonary disease with (acute) 

exacerbation


Status:  Acute


Assessment and Plan


82 year old [place] female with COPD, IDDM, tobacco abuse, and history of breast 

cancer admitted on 4/21 for acute COPD exacerbation.





1. COPD exacerbation


- Desatted to 78% on room air in the ED


- Maintaining adequate sats on 3 L NC


- DuoNeb Q4H scheduled 


- Decrease SoluMedrol to 40 IV Q12 and plan to change to PO tomorrow


- Start on Symbicort and provide Rx on discharge as well as albuterol rescue 

inhaler


- White cell count bumped from 5.9 to 11 likely secondary to steroid 

demargination but added Levaquin for coverage of exacerbation since severe 

enough to require hospitalization


- Counseled extensively on the need to be compliant with maintenance inhalers 

and the absolute need to stop smoking





2. Hypoxia


- ABG on admission: pH 7.33, PCO2 54, PO2 70, HCO3 28


- Supplemental O2 PRN


- D-dimer elevated given hypoxia and h/o breast cancer but CTA negative for 

embolism


- Respiratory walk test since patient anticipated to be discharged tomorrow





3. Insulin dependent DM


- SSI per protocol


- Blood sugars have been elevated in 200-400 range, likely secondary to steroid 

use


- Change to diabetic diet


- Increase Levemir to 35 units BID.  Restart metformin





4. Tobacco abuse


- Refusing nicotine patch


- Ativan PRN withdraw symptoms


- Counseled extensively on cessation





5. Fib with RVR.  TSH within normal limits.  Now in sinus rhythm continue 

Pradaxa, amiodarone and Coreg follow-up echocardiogram.





6.  Coag negative bacteremia in 1 out of 8 bottles likely pseudo-anemia.  

Continue to monitor





DVT prophylaxis: SCDs/teds/Lovenox SQ


Discharge Planning


Stable for transfer to floor.  Possible discharge in the morning











Emory Marcano MD Apr 24, 2018 08:51

## 2018-04-24 NOTE — HHI.DCPOC
Discharge Care Plan


Diagnosis:  


(1) COPD exacerbation


(2) Paroxysmal atrial fibrillation








Your Health Problems Are: Difficulty with ADL





 Exercise Tolerance








Goals to Promote Your Health


* To prevent worsening of your condition and complications


* To maintain your health at the optimal level


Directions to Meet Your Goals


*** Take your medications as prescribed


*** Follow your dietary instruction


*** Follow activity as directed








*** Keep your appointments as scheduled


*** Take your immunizations and boosters as scheduled


*** If your symptoms worsen call your PCP, if no PCP go to Urgent Care Center 

or Emergency Room***


*** Smoking is Dangerous to Your Health. Avoid second hand smoke***


***Call the 24-hour hour crisis hotline for domestic abuse at 1-876.994.5178***











Emory Marcano MD Apr 24, 2018 09:01

## 2018-04-25 VITALS
RESPIRATION RATE: 16 BRPM | DIASTOLIC BLOOD PRESSURE: 78 MMHG | OXYGEN SATURATION: 93 % | SYSTOLIC BLOOD PRESSURE: 175 MMHG | HEART RATE: 72 BPM | TEMPERATURE: 97.6 F

## 2018-04-25 VITALS
OXYGEN SATURATION: 95 % | RESPIRATION RATE: 21 BRPM | HEART RATE: 69 BPM | DIASTOLIC BLOOD PRESSURE: 67 MMHG | SYSTOLIC BLOOD PRESSURE: 126 MMHG | TEMPERATURE: 97 F

## 2018-04-25 VITALS
TEMPERATURE: 97.6 F | DIASTOLIC BLOOD PRESSURE: 69 MMHG | OXYGEN SATURATION: 92 % | SYSTOLIC BLOOD PRESSURE: 150 MMHG | HEART RATE: 75 BPM | RESPIRATION RATE: 16 BRPM

## 2018-04-25 VITALS
RESPIRATION RATE: 18 BRPM | HEART RATE: 80 BPM | OXYGEN SATURATION: 95 % | TEMPERATURE: 98.1 F | DIASTOLIC BLOOD PRESSURE: 68 MMHG | SYSTOLIC BLOOD PRESSURE: 128 MMHG

## 2018-04-25 VITALS — OXYGEN SATURATION: 95 %

## 2018-04-25 RX ADMIN — Medication SCH ML: at 09:01

## 2018-04-25 RX ADMIN — METHYLPREDNISOLONE SODIUM SUCCINATE SCH MG: 40 INJECTION, POWDER, FOR SOLUTION INTRAMUSCULAR; INTRAVENOUS at 09:01

## 2018-04-25 RX ADMIN — GUAIFENESIN SCH MG: 600 TABLET, EXTENDED RELEASE ORAL at 09:01

## 2018-04-25 RX ADMIN — CARVEDILOL SCH MG: 6.25 TABLET, FILM COATED ORAL at 09:01

## 2018-04-25 RX ADMIN — LEVOFLOXACIN SCH MG: 500 TABLET, FILM COATED ORAL at 09:01

## 2018-04-25 RX ADMIN — ACYCLOVIR SCH UNITS: 800 TABLET ORAL at 09:00

## 2018-04-25 RX ADMIN — AMIODARONE HYDROCHLORIDE SCH MG: 200 TABLET ORAL at 09:01

## 2018-04-25 RX ADMIN — IPRATROPIUM BROMIDE AND ALBUTEROL SULFATE SCH AMPULE: .5; 3 SOLUTION RESPIRATORY (INHALATION) at 08:00

## 2018-04-25 RX ADMIN — HUMAN INSULIN SCH: 100 INJECTION, SOLUTION SUBCUTANEOUS at 12:56

## 2018-04-25 RX ADMIN — BUDESONIDE AND FORMOTEROL FUMARATE DIHYDRATE SCH PUFF: 80; 4.5 AEROSOL RESPIRATORY (INHALATION) at 09:03

## 2018-04-25 RX ADMIN — STANDARDIZED SENNA CONCENTRATE AND DOCUSATE SODIUM SCH TAB: 8.6; 5 TABLET, FILM COATED ORAL at 09:01

## 2018-04-25 RX ADMIN — DABIGATRAN ETEXILATE MESYLATE SCH MG: 150 CAPSULE ORAL at 09:01

## 2018-04-25 RX ADMIN — HUMAN INSULIN SCH: 100 INJECTION, SOLUTION SUBCUTANEOUS at 08:59

## 2018-04-25 NOTE — PD.CARD.PN
Subjective


Subjective Remarks


No further CP.  No dizziness, palpitations.  Dyspnea improved.





Objective


Medications











Item Value  Date Time


 


Carvedilol 6.25 mg 4/23/18 2100





 (Coreg) Q12HR/PO 4/24/18 2205


 


Amiodarone HCl 400 mg 4/23/18 2100





 (Cordarone) Q12HR/PO 4/24/18 2205


 


Dabigatran 150 mg 4/23/18 2100





 (Pradaxa) BID/PO 4/24/18 2205











Current Medications








 Medications


  (Trade)  Dose


 Ordered  Sig/Damaso


 Route  Start Time


 Stop Time Status Last Admin


 


  (NS Flush)  2 ml  UNSCH  PRN


 IV FLUSH  4/21/18 11:15


    4/22/18 03:21


 


 


  (NS Flush)  2 ml  BID


 IV FLUSH  4/21/18 21:00


    4/24/18 22:02


 


 


  (Tylenol)  650 mg  Q4H  PRN


 PO  4/21/18 11:15


    4/22/18 08:13


 


 


  (Zofran Inj)  4 mg  Q6H  PRN


 IVP  4/21/18 11:15


     


 


 


  (Narcan Inj)  0.4 mg  UNSCH  PRN


 IV PUSH  4/21/18 11:15


     


 


 


  (Sarah-Colace)  1 tab  BID


 PO  4/21/18 21:00


    4/24/18 22:05


 


 


  (Milk Of


 Magnesia Liq)  30 ml  Q12H  PRN


 PO  4/21/18 11:15


     


 


 


  (Senokot)  17.2 mg  Q12H  PRN


 PO  4/21/18 11:15


     


 


 


  (Dulcolax Supp)  10 mg  DAILY  PRN


 RECTAL  4/21/18 11:15


     


 


 


  (Lactulose Liq)  30 ml  DAILY  PRN


 PO  4/21/18 11:15


     


 


 


  (Duoneb Neb)  1 ampule  Q4HR WHILE AWAKE  NEB


 NEB  4/21/18 12:00


    4/24/18 20:36


 


 


  (Duoneb Neb)  1 ampule  Q2HR NEB  PRN


 NEB  4/21/18 11:15


    4/25/18 00:38


 


 


  (D50w (Vial) Inj)  50 ml  UNSCH  PRN


 IV PUSH  4/21/18 11:30


     


 


 


  (Glucagon Inj)  1 mg  UNSCH  PRN


 OTHER  4/21/18 11:30


     


 


 


  (NovoLIN R


 SUPPLEMENTAL


 SCALE)  1  ACHS SLIDING  SCALE


 SQ  4/21/18 12:00


    4/24/18 22:15


 


 


  (Ativan)  0.5 mg  Q12H  PRN


 PO  4/21/18 18:30


     


 


 


  (Levaquin)  500 mg  DAILY


 PO  4/22/18 09:00


 4/28/18 08:59  4/24/18 08:46


 


 


  (Mucinex Er)  600 mg  BID


 PO  4/22/18 21:00


    4/24/18 22:05


 


 


  (SoluMEDROL INJ)  40 mg  Q12HR


 IV PUSH  4/23/18 21:00


    4/24/18 22:02


 


 


  (Levemir Inj)  35 units  BID


 SQ  4/23/18 21:00


    4/24/18 22:06


 


 


  (Symbicort


 80-4.5 Mcg Inh)  1 puff  Q12HR


 INH  4/23/18 21:00


    4/24/18 22:01


 


 


  (Coreg)  6.25 mg  Q12HR


 PO  4/23/18 21:00


    4/24/18 22:05


 


 


  (Cordarone)  400 mg  Q12HR


 PO  4/23/18 21:00


    4/24/18 22:05


 


 


  (Pradaxa)  150 mg  BID


 PO  4/23/18 21:00


    4/24/18 22:05


 


 


  (Glucophage)  1,000 mg  BIDPC


 PO  4/23/18 19:00


    4/24/18 16:56


 








Vital Signs / I&O





Vital Signs








  Date Time  Temp Pulse Resp B/P (MAP) Pulse Ox O2 Delivery O2 Flow Rate FiO2


 


4/25/18 05:50       3.00 


 


4/25/18 04:00 97.0 69 21 126/67 (86) 95   


 


4/25/18 00:39     95 Nasal Cannula 2.00 


 


4/25/18 00:30 98.1 80 18 128/68 (88) 95   


 


4/24/18 21:30 97.6 88 19 131/62 (85) 93   


 


4/24/18 20:37     91 Nasal Cannula 2.00 


 


4/24/18 16:50     95 Nasal Cannula 2.00 


 


4/24/18 15:53 98.1 77 20 126/64 (84) 92   


 


4/24/18 15:20   20     


 


4/24/18 13:00  78      


 


4/24/18 13:00  78 41  87   


 


4/24/18 12:17  72      


 


4/24/18 12:17  72 43 141/64 (89) 91   


 


4/24/18 12:00  81      


 


4/24/18 12:00 98.6 81 36 140/104 (116) 89   


 


4/24/18 10:00  84      


 


4/24/18 09:02  80 44 136/76 (96) 97   


 


4/24/18 09:02  80      


 


4/24/18 09:00  79 45  97   


 


4/24/18 09:00  79      


 


4/24/18 08:16     98 Nasal Cannula 2.00 


 


4/24/18 08:00  78      


 


4/24/18 08:00 98.9 78 52 130/63 (85)    














I/O      


 


 4/24/18 4/24/18 4/24/18 4/25/18 4/25/18 4/25/18





 07:00 15:00 23:00 07:00 15:00 23:00


 


Intake Total 300 ml  550 ml 1035 ml  


 


Balance 300 ml  550 ml 1035 ml  


 


      


 


Intake Oral 300 ml  550 ml 1035 ml  


 


# Voids 3  4 3  


 


# Bowel Movements   0 0  








Physical Exam


GENERAL: Well developed, well nourished. No acute distress.


HEENT: Jugular venous pressure is normal. 


CHEST: Diminished breath sounds diffusely.


CARDIAC: Regular rate and rhythm without S3, S4, or murmur.


ABDOMEN: Soft, nontender, no hepatosplenomegaly. Bowel sounds present.


EXTREMITIES: No clubbing, cyanosis, or edema.





Assessment and Plan


Problem List:  


(1) Paroxysmal atrial fibrillation


ICD Codes:  I48.0 - Paroxysmal atrial fibrillation


Status:  Acute


Plan:  Stable overnight. No definite atrial fib though patient not placed on 

monitor.   Echo unremarkable. No evidence for ACS.   Thromboembolic risk 

moderately elevated with diabetes, advanced age.





REC continue oral Amiodarone, decrease dose to 200 mg daily


         continue carvedilol, dabigatran


         OK to discharge from cardiac standpoint, f/u with a cardiologist in 

[place]





(2) Chest pain


ICD Codes:  R07.9 - Chest pain, unspecified


Status:  Acute


Plan:  Stable overnight.  No further atypical CP.  Cardiac enzymes negative for 

MI.   Patient reports normal cath 6 years ago.   Rec conservative management 

and work up unless she develops recurrent symptoms.





Code Status


full code


Discussed Condition With


patient





Problem Qualifiers





(1) Chest pain:  


Qualified Codes:  R07.9 - Chest pain, unspecified








Cesar Connors MD Apr 25, 2018 07:56

## 2018-04-25 NOTE — HHI.DS
__________________________________________________





Discharge Summary


Admission Date


Apr 21, 2018 at 11:20 am


Discharge Date:  Apr 25, 2018


Admitting Diagnosis








(1) Insulin dependent diabetes mellitus


ICD Code:  E11.9 - Type 2 diabetes mellitus without complications; Z79.4 - Long 

term (current) use of insulin


(2) GERD (gastroesophageal reflux disease)


ICD Code:  K21.9 - Gastro-esophageal reflux disease without esophagitis


(3) COPD exacerbation


ICD Code:  J44.1 - Chronic obstructive pulmonary disease with (acute) 

exacerbation


Diagnosis:  Principal


Status:  Acute


Procedures


none


Brief History - From Admission


Patient is a very pleasant 81yo F with PMH of COPD not on home oxygen  presents 

to the ED with c/o sob and cough since yesterday.  Said she is a heavy cig 

smoker and smoke a pack a day.  Denies any fever, chest pain, sob, n/v, 

abdominal pain, focal weakness or numbness.  Patient  is from [place] and 

visiting her friend.  Says she never been hospitalized for COPD in the past.  

She is following with her doctors and is taking her medications.  Does not have 

a pulmonology doctor.


Denies chest pain  or palpitations. No n/v/d/c. No urinary complaints.


CBC/BMP:  


4/24/18 0409                                                                   

             4/24/18 0409





Significant Findings





Laboratory Tests








Test


  4/23/18


03:30 4/23/18


13:30 4/23/18


14:07 4/23/18


21:02


 


White Blood Count


  13.2 TH/MM3


(4.0-11.0) 


  


  


 


 


Neutrophils (%) (Auto)


  89.2 %


(16.0-70.0) 


  


  


 


 


Lymphocytes (%) (Auto)


  4.7 %


(9.0-44.0) 


  


  


 


 


Neutrophils # (Auto)


  11.8 TH/MM3


(1.8-7.7) 


  


  


 


 


Lymphocytes # (Auto)


  0.6 TH/MM3


(1.0-4.8) 


  


  


 


 


Blood Urea Nitrogen


  37 MG/DL


(7-18) 


  35 MG/DL


(7-18) 


 


 


Random Glucose


  195 MG/DL


() 


  354 MG/DL


() 


 


 


Estimat Glomerular Filtration


Rate 70 ML/MIN


(>89) 


  54 ML/MIN


(>89) 


 


 


Blood Gas HCO3


  


  27 mmol/L


(22-26) 


  


 


 


Blood Gas Base Excess


  


  3.2 mmol/L


(-2-2) 


  


 


 


Arterial Blood pH


  


  7.43


(7.380-7.420) 


  


 


 


Troponin I


  


  


  LESS THAN 0.02


NG/ML LESS THAN 0.02


NG/ML


 


Test


  4/24/18


04:09 


  


  


 


 


Neutrophils (%) (Auto)


  85.0 %


(16.0-70.0) 


  


  


 


 


Lymphocytes (%) (Auto)


  7.3 %


(9.0-44.0) 


  


  


 


 


Neutrophils # (Auto)


  9.1 TH/MM3


(1.8-7.7) 


  


  


 


 


Lymphocytes # (Auto)


  0.8 TH/MM3


(1.0-4.8) 


  


  


 


 


Blood Urea Nitrogen


  33 MG/DL


(7-18) 


  


  


 


 


Random Glucose


  186 MG/DL


() 


  


  


 


 


Estimat Glomerular Filtration


Rate 71 ML/MIN


(>89) 


  


  


 








Imaging





Last Impressions








Chest X-Ray 4/21/18 0906 Signed





Impressions: 





 Service Date/Time:  Saturday, April 21, 2018 09:34 - CONCLUSION: No acute 





 disease.       Zacarias Magaña MD 


 


CT Angiography 4/21/18 0000 Signed





Impressions: 





 Service Date/Time:  Saturday, April 21, 2018 17:37 - CONCLUSION:  1. No 

filling 





 defects to suggest pulmonary embolic disease. No effusions or adenopathy.      





 Gold Wilde MD 








PE at Discharge


GENERAL: Pleasant, obese [place] female sitting up eating breakfast.


SKIN: Warm and dry.


HEENT: AT/NC. Pupils equal and round. MMM.


HEART: RRR no m/r/g.


LUNGS: Diminished breath sounds with expiratory wheezing and prolonged 

expiratory phase. 


ABDOMEN: +BS, soft, NT, ND.


EXTREMITIES: No LE edema. Diminished but palpable pedal pulses.


NEURO: Awake and alert. Nonfocal.


PSYCH: Appropriate mood and affect.


Pt update on day of discharge


Patient is currently doing well.  Sitting in her chair.  No acute concerns.  No 

fever or chills.  Discussed with patient's daughter who is coming to pick her 

up today.


Hospital Course


82 year old [place] female with COPD, IDDM, tobacco abuse, and history of breast 

cancer admitted on 4/21 for acute COPD exacerbation.





1. COPD exacerbation


- Desatted to 78% on room air in the ED


- Maintaining adequate sats on 3 L NC


- DuoNeb Q4H scheduled 


- Decrease SoluMedrol to 40 IV Q12 and plan to change to PO tomorrow


- Start on Symbicort and provide Rx on discharge as well as albuterol rescue 

inhaler


- White cell count bumped from 5.9 to 11 likely secondary to steroid 

demargination but added Levaquin for coverage of exacerbation since severe 

enough to require hospitalization


- Counseled extensively on the need to be compliant with maintenance inhalers 

and the absolute need to stop smoking





2. Hypoxia


- ABG on admission: pH 7.33, PCO2 54, PO2 70, HCO3 28


- Supplemental O2 PRN


- D-dimer elevated given hypoxia and h/o breast cancer but CTA negative for 

embolism


- Respiratory walk test since patient anticipated to be discharged tomorrow





3. Insulin dependent DM


- SSI per protocol


- Blood sugars have been elevated in 200-400 range, likely secondary to steroid 

use


- Change to diabetic diet


- Increase Levemir to 35 units BID.  Restart metformin





4. Tobacco abuse


- Refusing nicotine patch


- Ativan PRN withdraw symptoms


- Counseled extensively on cessation





5. Fib with RVR.  TSH within normal limits.  Now in sinus rhythm continue 

Pradaxa, amiodarone and Coreg follow-up echocardiogram.





6.  Coag negative bacteremia in 1 out of 8 bottles likely pseudo-anemia.  

Continue to monitor





DVT prophylaxis: SCDs/teds/Lovenox SQ


Pt Condition on Discharge:  Good


Discharge Disposition:  Disch w/ Home Health Serv


Discharge Time:  > 30 minutes


Discharge Instructions


DIET: Follow Instructions for:  Diabetic Diet


Activities you can perform:  Regular-No Restrictions


Follow up Referrals:  


Cardiology - 1 Week


PCP Follow-up - 2-3 Days


Pulmonology - 1 Week





New Medications:  


Nebulizer (Nebulizer) 1 Mis Mis


EA .XX AS DIRECTED for Breathing Treatment, #1 0 Refills





Oxygen (O2) (Oxygen (O2))  Device


LITER ANDREINA.CANULA CONTINUOUS for Prevent Hypoxemia, #2


Oxygen Concentrator Portable Gaseous


 2 L/min via Nasal Canula Continuous


 For 99 months


Prednisone (Prednisone) 20 Mg Tab


40 MG PO DAILY for Control Inflammation, #10 TAB 0 Refills


Take 40 mg (2 tablets) daily for 5 days


Amiodarone (Amiodarone) 200 Mg Tab


400 MG PO Q12HR for Regulate Heart Beat, #120 TAB





Carvedilol (Coreg) 6.25 Mg Tab


6.25 MG PO Q12HR for Regulate Heart Beat, #60 TAB





Dabigatran (Pradaxa) 150 Mg Cap


150 MG PO BID for Prevent Blood Clot, #60 CAP





Levofloxacin (Levaquin) 500 Mg Tablet


500 MG PO DAILY for Infection, #3 TAB





[Albuterol-Ipratropium Neb] () 1 AMPULE NEBU


1 AMPULE NEB QID NEB for Breathing Treatment, #120 NEBULE





[Albuterol-Ipratropium Neb] () 1 AMPULE NEBU


1 AMPULE NEB Q2HR NEB PRN for sob/wheezing , #120 NEBULE





[Budeson-Formot 80-4.5 Mcg Inh] () 60 PUFF AERO


1 PUFF INH Q12HR for Breathing Treatment, #1 PUFF





 


Continued Medications:  


Insulin Aspart Inj (Novolog Inj) 1,000 Unit/10 Ml Vial


3 UNITS SQ BID for Blood Sugar Management, #1 INJECTION 0 Refills





Insulin Detemir Inj (Levemir Inj) 1,000 unit/ 10 ML Vial


30 UNITS SQ BID for Blood Sugar Management, VIAL 0 Refills


Do not mix with any other Insulin.


Metformin (Metformin) 1,000 Mg Tab


1000 MG PO BIDPC for Blood Sugar Management, #60 TAB 0 Refills

















Phu Aguilar DO Apr 25, 2018 10:23 am